# Patient Record
Sex: MALE | Race: OTHER | NOT HISPANIC OR LATINO | URBAN - METROPOLITAN AREA
[De-identification: names, ages, dates, MRNs, and addresses within clinical notes are randomized per-mention and may not be internally consistent; named-entity substitution may affect disease eponyms.]

---

## 2022-12-22 ENCOUNTER — INPATIENT (INPATIENT)
Facility: HOSPITAL | Age: 82
LOS: 5 days | Discharge: NURSING, REHAB OR LONG-TERM CARE | DRG: 637 | End: 2022-12-28
Attending: INTERNAL MEDICINE | Admitting: STUDENT IN AN ORGANIZED HEALTH CARE EDUCATION/TRAINING PROGRAM
Payer: MEDICARE

## 2022-12-22 VITALS
DIASTOLIC BLOOD PRESSURE: 53 MMHG | HEART RATE: 128 BPM | OXYGEN SATURATION: 100 % | WEIGHT: 154.32 LBS | RESPIRATION RATE: 20 BRPM | SYSTOLIC BLOOD PRESSURE: 84 MMHG

## 2022-12-22 LAB
ALBUMIN SERPL ELPH-MCNC: 2.5 G/DL — LOW (ref 3.4–5)
ALP SERPL-CCNC: 76 U/L — SIGNIFICANT CHANGE UP (ref 40–120)
ALT FLD-CCNC: 13 U/L — SIGNIFICANT CHANGE UP (ref 12–42)
ANION GAP SERPL CALC-SCNC: 12 MMOL/L — SIGNIFICANT CHANGE UP (ref 5–17)
ANION GAP SERPL CALC-SCNC: 14 MMOL/L — SIGNIFICANT CHANGE UP (ref 5–17)
ANION GAP SERPL CALC-SCNC: 22 MMOL/L — HIGH (ref 5–17)
ANION GAP SERPL CALC-SCNC: 32 MMOL/L — HIGH (ref 9–16)
ANISOCYTOSIS BLD QL: SLIGHT — SIGNIFICANT CHANGE UP
APPEARANCE UR: CLEAR — SIGNIFICANT CHANGE UP
APTT BLD: 27 SEC — LOW (ref 27.5–35.5)
AST SERPL-CCNC: 14 U/L — LOW (ref 15–37)
B-OH-BUTYR SERPL-SCNC: 3.6 MMOL/L — HIGH
BACTERIA # UR AUTO: PRESENT /HPF
BASE EXCESS BLDA CALC-SCNC: -14.3 MMOL/L — LOW (ref -2–3)
BASOPHILS # BLD AUTO: 0.03 K/UL — SIGNIFICANT CHANGE UP (ref 0–0.2)
BASOPHILS NFR BLD AUTO: 0.3 % — SIGNIFICANT CHANGE UP (ref 0–2)
BILIRUB SERPL-MCNC: 0.8 MG/DL — SIGNIFICANT CHANGE UP (ref 0.2–1.2)
BILIRUB UR-MCNC: ABNORMAL
BLD GP AB SCN SERPL QL: NEGATIVE — SIGNIFICANT CHANGE UP
BUN SERPL-MCNC: 76 MG/DL — HIGH (ref 7–23)
BUN SERPL-MCNC: 77 MG/DL — HIGH (ref 7–23)
BUN SERPL-MCNC: 86 MG/DL — HIGH (ref 7–23)
BUN SERPL-MCNC: 98 MG/DL — HIGH (ref 7–23)
CALCIUM SERPL-MCNC: 7.7 MG/DL — LOW (ref 8.4–10.5)
CALCIUM SERPL-MCNC: 8.2 MG/DL — LOW (ref 8.5–10.5)
CHLORIDE SERPL-SCNC: 105 MMOL/L — SIGNIFICANT CHANGE UP (ref 96–108)
CHLORIDE SERPL-SCNC: 106 MMOL/L — SIGNIFICANT CHANGE UP (ref 96–108)
CHLORIDE SERPL-SCNC: 107 MMOL/L — SIGNIFICANT CHANGE UP (ref 96–108)
CHLORIDE SERPL-SCNC: 96 MMOL/L — SIGNIFICANT CHANGE UP (ref 96–108)
CO2 BLDA-SCNC: 13 MMOL/L — LOW (ref 19–24)
CO2 SERPL-SCNC: 10 MMOL/L — CRITICAL LOW (ref 22–31)
CO2 SERPL-SCNC: 14 MMOL/L — LOW (ref 22–31)
CO2 SERPL-SCNC: 19 MMOL/L — LOW (ref 22–31)
CO2 SERPL-SCNC: 21 MMOL/L — LOW (ref 22–31)
COLOR SPEC: YELLOW — SIGNIFICANT CHANGE UP
CREAT SERPL-MCNC: 2.85 MG/DL — HIGH (ref 0.5–1.3)
CREAT SERPL-MCNC: 2.98 MG/DL — HIGH (ref 0.5–1.3)
CREAT SERPL-MCNC: 3.45 MG/DL — HIGH (ref 0.5–1.3)
CREAT SERPL-MCNC: 4.73 MG/DL — HIGH (ref 0.5–1.3)
DIFF PNL FLD: ABNORMAL
EGFR: 12 ML/MIN/1.73M2 — LOW
EGFR: 17 ML/MIN/1.73M2 — LOW
EGFR: 20 ML/MIN/1.73M2 — LOW
EGFR: 21 ML/MIN/1.73M2 — LOW
EOSINOPHIL # BLD AUTO: 0 K/UL — SIGNIFICANT CHANGE UP (ref 0–0.5)
EOSINOPHIL NFR BLD AUTO: 0 % — SIGNIFICANT CHANGE UP (ref 0–6)
EPI CELLS # UR: SIGNIFICANT CHANGE UP /HPF (ref 0–5)
FLUAV AG NPH QL: SIGNIFICANT CHANGE UP
FLUBV AG NPH QL: SIGNIFICANT CHANGE UP
GLUCOSE BLDC GLUCOMTR-MCNC: 423 MG/DL — HIGH (ref 70–99)
GLUCOSE BLDC GLUCOMTR-MCNC: 458 MG/DL — CRITICAL HIGH (ref 70–99)
GLUCOSE BLDC GLUCOMTR-MCNC: 523 MG/DL — CRITICAL HIGH (ref 70–99)
GLUCOSE BLDC GLUCOMTR-MCNC: 528 MG/DL — CRITICAL HIGH (ref 70–99)
GLUCOSE BLDC GLUCOMTR-MCNC: 600 MG/DL — CRITICAL HIGH (ref 70–99)
GLUCOSE BLDC GLUCOMTR-MCNC: >600 MG/DL — CRITICAL HIGH (ref 70–99)
GLUCOSE SERPL-MCNC: 1124 MG/DL — CRITICAL HIGH (ref 70–99)
GLUCOSE SERPL-MCNC: 510 MG/DL — CRITICAL HIGH (ref 70–99)
GLUCOSE SERPL-MCNC: 574 MG/DL — CRITICAL HIGH (ref 70–99)
GLUCOSE SERPL-MCNC: 863 MG/DL — CRITICAL HIGH (ref 70–99)
GLUCOSE UR QL: >=1000
HCO3 BLDA-SCNC: 12 MMOL/L — LOW (ref 21–28)
HCT VFR BLD CALC: 43.5 % — SIGNIFICANT CHANGE UP (ref 39–50)
HGB BLD-MCNC: 12.7 G/DL — LOW (ref 13–17)
HOROWITZ INDEX BLDA+IHG-RTO: 28 — SIGNIFICANT CHANGE UP
IMM GRANULOCYTES NFR BLD AUTO: 3.3 % — HIGH (ref 0–0.9)
INR BLD: 0.96 — SIGNIFICANT CHANGE UP (ref 0.88–1.16)
KETONES UR-MCNC: 15 MG/DL
LACTATE SERPL-SCNC: 1.7 MMOL/L — SIGNIFICANT CHANGE UP (ref 0.5–2)
LACTATE SERPL-SCNC: 2.8 MMOL/L — HIGH (ref 0.5–2)
LACTATE SERPL-SCNC: 3 MMOL/L — HIGH (ref 0.5–2)
LACTATE SERPL-SCNC: 9.1 MMOL/L — CRITICAL HIGH (ref 0.4–2)
LEUKOCYTE ESTERASE UR-ACNC: NEGATIVE — SIGNIFICANT CHANGE UP
LYMPHOCYTES # BLD AUTO: 0.26 K/UL — LOW (ref 1–3.3)
LYMPHOCYTES # BLD AUTO: 2.5 % — LOW (ref 13–44)
MACROCYTES BLD QL: SIGNIFICANT CHANGE UP
MAGNESIUM SERPL-MCNC: 2.8 MG/DL — HIGH (ref 1.6–2.6)
MAGNESIUM SERPL-MCNC: 3.3 MG/DL — HIGH (ref 1.6–2.6)
MANUAL SMEAR VERIFICATION: SIGNIFICANT CHANGE UP
MCHC RBC-ENTMCNC: 29.2 GM/DL — LOW (ref 32–36)
MCHC RBC-ENTMCNC: 34.2 PG — HIGH (ref 27–34)
MCV RBC AUTO: 117.3 FL — HIGH (ref 80–100)
MONOCYTES # BLD AUTO: 0.74 K/UL — SIGNIFICANT CHANGE UP (ref 0–0.9)
MONOCYTES NFR BLD AUTO: 7.1 % — SIGNIFICANT CHANGE UP (ref 2–14)
NEUTROPHILS # BLD AUTO: 9.07 K/UL — HIGH (ref 1.8–7.4)
NEUTROPHILS NFR BLD AUTO: 86.8 % — HIGH (ref 43–77)
NITRITE UR-MCNC: NEGATIVE — SIGNIFICANT CHANGE UP
NRBC # BLD: 0 /100 WBCS — SIGNIFICANT CHANGE UP (ref 0–0)
PCO2 BLDA: 28 MMHG — LOW (ref 35–48)
PCO2 BLDV: 31 MMHG — LOW (ref 42–55)
PCO2 BLDV: 33 MMHG — LOW (ref 42–55)
PH BLDA: 7.23 — LOW (ref 7.35–7.45)
PH BLDV: 6.95 — LOW (ref 7.32–7.43)
PH BLDV: 7.1 — LOW (ref 7.32–7.43)
PH UR: 6 — SIGNIFICANT CHANGE UP (ref 5–8)
PHOSPHATE SERPL-MCNC: 3.8 MG/DL — SIGNIFICANT CHANGE UP (ref 2.5–4.5)
PLAT MORPH BLD: NORMAL — SIGNIFICANT CHANGE UP
PLATELET # BLD AUTO: 304 K/UL — SIGNIFICANT CHANGE UP (ref 150–400)
PLATELET COUNT - ESTIMATE: NORMAL — SIGNIFICANT CHANGE UP
PO2 BLDA: 104 MMHG — SIGNIFICANT CHANGE UP (ref 83–108)
PO2 BLDV: 43 MMHG — SIGNIFICANT CHANGE UP (ref 25–45)
PO2 BLDV: <35 MMHG — SIGNIFICANT CHANGE UP (ref 25–45)
POLYCHROMASIA BLD QL SMEAR: SLIGHT — SIGNIFICANT CHANGE UP
POTASSIUM SERPL-MCNC: 3.8 MMOL/L — SIGNIFICANT CHANGE UP (ref 3.5–5.3)
POTASSIUM SERPL-MCNC: 3.9 MMOL/L — SIGNIFICANT CHANGE UP (ref 3.5–5.3)
POTASSIUM SERPL-MCNC: 4.1 MMOL/L — SIGNIFICANT CHANGE UP (ref 3.5–5.3)
POTASSIUM SERPL-MCNC: 4.7 MMOL/L — SIGNIFICANT CHANGE UP (ref 3.5–5.3)
POTASSIUM SERPL-SCNC: 3.8 MMOL/L — SIGNIFICANT CHANGE UP (ref 3.5–5.3)
POTASSIUM SERPL-SCNC: 3.9 MMOL/L — SIGNIFICANT CHANGE UP (ref 3.5–5.3)
POTASSIUM SERPL-SCNC: 4.1 MMOL/L — SIGNIFICANT CHANGE UP (ref 3.5–5.3)
POTASSIUM SERPL-SCNC: 4.7 MMOL/L — SIGNIFICANT CHANGE UP (ref 3.5–5.3)
PROT SERPL-MCNC: 5.7 G/DL — LOW (ref 6.4–8.2)
PROT UR-MCNC: 100 MG/DL
PROTHROM AB SERPL-ACNC: 11.2 SEC — SIGNIFICANT CHANGE UP (ref 10.5–13.4)
RBC # BLD: 3.71 M/UL — LOW (ref 4.2–5.8)
RBC # FLD: 15.3 % — HIGH (ref 10.3–14.5)
RBC BLD AUTO: ABNORMAL
RBC CASTS # UR COMP ASSIST: ABNORMAL /HPF
RH IG SCN BLD-IMP: NEGATIVE — SIGNIFICANT CHANGE UP
RSV RNA NPH QL NAA+NON-PROBE: SIGNIFICANT CHANGE UP
SAO2 % BLDA: 100 % — HIGH (ref 94–98)
SAO2 % BLDV: 43.1 % — LOW (ref 67–88)
SAO2 % BLDV: 61.4 % — LOW (ref 67–88)
SARS-COV-2 RNA SPEC QL NAA+PROBE: SIGNIFICANT CHANGE UP
SODIUM SERPL-SCNC: 138 MMOL/L — SIGNIFICANT CHANGE UP (ref 132–145)
SODIUM SERPL-SCNC: 139 MMOL/L — SIGNIFICANT CHANGE UP (ref 135–145)
SODIUM SERPL-SCNC: 140 MMOL/L — SIGNIFICANT CHANGE UP (ref 135–145)
SODIUM SERPL-SCNC: 141 MMOL/L — SIGNIFICANT CHANGE UP (ref 135–145)
SP GR SPEC: 1.01 — SIGNIFICANT CHANGE UP (ref 1–1.03)
TROPONIN T SERPL-MCNC: 0.08 NG/ML — CRITICAL HIGH (ref 0–0.01)
UROBILINOGEN FLD QL: 0.2 E.U./DL — SIGNIFICANT CHANGE UP
WBC # BLD: 10.44 K/UL — SIGNIFICANT CHANGE UP (ref 3.8–10.5)
WBC # FLD AUTO: 10.44 K/UL — SIGNIFICANT CHANGE UP (ref 3.8–10.5)
WBC UR QL: < 5 /HPF — SIGNIFICANT CHANGE UP

## 2022-12-22 PROCEDURE — 99291 CRITICAL CARE FIRST HOUR: CPT

## 2022-12-22 PROCEDURE — 36000 PLACE NEEDLE IN VEIN: CPT

## 2022-12-22 PROCEDURE — 99223 1ST HOSP IP/OBS HIGH 75: CPT | Mod: GC

## 2022-12-22 PROCEDURE — 76937 US GUIDE VASCULAR ACCESS: CPT | Mod: 26

## 2022-12-22 PROCEDURE — 70450 CT HEAD/BRAIN W/O DYE: CPT | Mod: 26

## 2022-12-22 PROCEDURE — 76942 ECHO GUIDE FOR BIOPSY: CPT | Mod: 26

## 2022-12-22 PROCEDURE — 71045 X-RAY EXAM CHEST 1 VIEW: CPT | Mod: 26

## 2022-12-22 PROCEDURE — 36620 INSERTION CATHETER ARTERY: CPT

## 2022-12-22 RX ORDER — POTASSIUM CHLORIDE 20 MEQ
10 PACKET (EA) ORAL ONCE
Refills: 0 | Status: COMPLETED | OUTPATIENT
Start: 2022-12-22 | End: 2022-12-22

## 2022-12-22 RX ORDER — DEXTROSE MONOHYDRATE, SODIUM CHLORIDE, AND POTASSIUM CHLORIDE 50; .745; 4.5 G/1000ML; G/1000ML; G/1000ML
1000 INJECTION, SOLUTION INTRAVENOUS
Refills: 0 | Status: DISCONTINUED | OUTPATIENT
Start: 2022-12-22 | End: 2022-12-22

## 2022-12-22 RX ORDER — INSULIN HUMAN 100 [IU]/ML
10 INJECTION, SOLUTION SUBCUTANEOUS
Qty: 100 | Refills: 0 | Status: DISCONTINUED | OUTPATIENT
Start: 2022-12-22 | End: 2022-12-23

## 2022-12-22 RX ORDER — SODIUM CHLORIDE 9 MG/ML
1000 INJECTION, SOLUTION INTRAVENOUS ONCE
Refills: 0 | Status: COMPLETED | OUTPATIENT
Start: 2022-12-22 | End: 2022-12-22

## 2022-12-22 RX ORDER — CHLORHEXIDINE GLUCONATE 213 G/1000ML
1 SOLUTION TOPICAL
Refills: 0 | Status: DISCONTINUED | OUTPATIENT
Start: 2022-12-22 | End: 2022-12-24

## 2022-12-22 RX ORDER — VANCOMYCIN HCL 1 G
1000 VIAL (EA) INTRAVENOUS ONCE
Refills: 0 | Status: COMPLETED | OUTPATIENT
Start: 2022-12-22 | End: 2022-12-22

## 2022-12-22 RX ORDER — SODIUM CHLORIDE 9 MG/ML
2000 INJECTION INTRAMUSCULAR; INTRAVENOUS; SUBCUTANEOUS ONCE
Refills: 0 | Status: COMPLETED | OUTPATIENT
Start: 2022-12-22 | End: 2022-12-22

## 2022-12-22 RX ORDER — SODIUM CHLORIDE 9 MG/ML
1000 INJECTION INTRAMUSCULAR; INTRAVENOUS; SUBCUTANEOUS ONCE
Refills: 0 | Status: COMPLETED | OUTPATIENT
Start: 2022-12-22 | End: 2022-12-22

## 2022-12-22 RX ORDER — DEXTROSE MONOHYDRATE, SODIUM CHLORIDE, AND POTASSIUM CHLORIDE 50; .745; 4.5 G/1000ML; G/1000ML; G/1000ML
1000 INJECTION, SOLUTION INTRAVENOUS
Refills: 0 | Status: DISCONTINUED | OUTPATIENT
Start: 2022-12-22 | End: 2022-12-23

## 2022-12-22 RX ORDER — SODIUM CHLORIDE 9 MG/ML
1000 INJECTION, SOLUTION INTRAVENOUS ONCE
Refills: 0 | Status: DISCONTINUED | OUTPATIENT
Start: 2022-12-22 | End: 2022-12-22

## 2022-12-22 RX ORDER — SODIUM BICARBONATE 1 MEQ/ML
50 SYRINGE (ML) INTRAVENOUS ONCE
Refills: 0 | Status: COMPLETED | OUTPATIENT
Start: 2022-12-22 | End: 2022-12-22

## 2022-12-22 RX ORDER — POTASSIUM CHLORIDE 20 MEQ
10 PACKET (EA) ORAL
Refills: 0 | Status: COMPLETED | OUTPATIENT
Start: 2022-12-22 | End: 2022-12-23

## 2022-12-22 RX ORDER — SODIUM CHLORIDE 9 MG/ML
1000 INJECTION, SOLUTION INTRAVENOUS
Refills: 0 | Status: DISCONTINUED | OUTPATIENT
Start: 2022-12-22 | End: 2022-12-22

## 2022-12-22 RX ORDER — INSULIN HUMAN 100 [IU]/ML
10 INJECTION, SOLUTION SUBCUTANEOUS ONCE
Refills: 0 | Status: COMPLETED | OUTPATIENT
Start: 2022-12-22 | End: 2022-12-22

## 2022-12-22 RX ORDER — INSULIN HUMAN 100 [IU]/ML
7 INJECTION, SOLUTION SUBCUTANEOUS
Qty: 100 | Refills: 0 | Status: DISCONTINUED | OUTPATIENT
Start: 2022-12-22 | End: 2022-12-22

## 2022-12-22 RX ORDER — PIPERACILLIN AND TAZOBACTAM 4; .5 G/20ML; G/20ML
3.38 INJECTION, POWDER, LYOPHILIZED, FOR SOLUTION INTRAVENOUS ONCE
Refills: 0 | Status: DISCONTINUED | OUTPATIENT
Start: 2022-12-22 | End: 2022-12-22

## 2022-12-22 RX ORDER — PIPERACILLIN AND TAZOBACTAM 4; .5 G/20ML; G/20ML
4.5 INJECTION, POWDER, LYOPHILIZED, FOR SOLUTION INTRAVENOUS ONCE
Refills: 0 | Status: COMPLETED | OUTPATIENT
Start: 2022-12-22 | End: 2022-12-22

## 2022-12-22 RX ADMIN — DEXTROSE MONOHYDRATE, SODIUM CHLORIDE, AND POTASSIUM CHLORIDE 250 MILLILITER(S): 50; .745; 4.5 INJECTION, SOLUTION INTRAVENOUS at 22:57

## 2022-12-22 RX ADMIN — Medication 50 MILLIEQUIVALENT(S): at 14:53

## 2022-12-22 RX ADMIN — PIPERACILLIN AND TAZOBACTAM 4.5 GRAM(S): 4; .5 INJECTION, POWDER, LYOPHILIZED, FOR SOLUTION INTRAVENOUS at 16:57

## 2022-12-22 RX ADMIN — PIPERACILLIN AND TAZOBACTAM 200 GRAM(S): 4; .5 INJECTION, POWDER, LYOPHILIZED, FOR SOLUTION INTRAVENOUS at 14:53

## 2022-12-22 RX ADMIN — Medication 1000 MILLIGRAM(S): at 16:57

## 2022-12-22 RX ADMIN — SODIUM CHLORIDE 1000 MILLILITER(S): 9 INJECTION, SOLUTION INTRAVENOUS at 18:45

## 2022-12-22 RX ADMIN — SODIUM CHLORIDE 2000 MILLILITER(S): 9 INJECTION INTRAMUSCULAR; INTRAVENOUS; SUBCUTANEOUS at 14:52

## 2022-12-22 RX ADMIN — INSULIN HUMAN 10 UNIT(S): 100 INJECTION, SOLUTION SUBCUTANEOUS at 14:55

## 2022-12-22 RX ADMIN — SODIUM CHLORIDE 3000 MILLILITER(S): 9 INJECTION, SOLUTION INTRAVENOUS at 18:45

## 2022-12-22 RX ADMIN — INSULIN HUMAN 7 UNIT(S)/HR: 100 INJECTION, SOLUTION SUBCUTANEOUS at 16:08

## 2022-12-22 RX ADMIN — Medication 250 MILLIGRAM(S): at 14:53

## 2022-12-22 RX ADMIN — SODIUM CHLORIDE 1000 MILLILITER(S): 9 INJECTION INTRAMUSCULAR; INTRAVENOUS; SUBCUTANEOUS at 15:37

## 2022-12-22 RX ADMIN — Medication 100 MILLIEQUIVALENT(S): at 19:40

## 2022-12-22 NOTE — ED ADULT NURSE NOTE - OBJECTIVE STATEMENT
Pt presents to ED as notification by EMS for AMS for a few days.. Hx fo gastric ca in remission. Arrives with FS of high and tachycardic.

## 2022-12-22 NOTE — H&P ADULT - HISTORY OF PRESENT ILLNESS
The patient is a 83 y/o Kyrgyz speaking male with hx of HTN, HLD, AFib, not on AC, DM on insulin, presents with AMS for several days. Per pt's family, pt has been refusing to eat or take any of his meds over the past 2-3 days. Notes patient has been complaining of increased thirst for days. No fever, chills, cough, vomiting, or diarrhea. Daughter called EMS today because he was far less communicative than usual. Normally, pt is A&Ox2-3, ambulatory, conversant.

## 2022-12-22 NOTE — ED PROVIDER NOTE - CLINICAL SUMMARY MEDICAL DECISION MAKING FREE TEXT BOX
81yo M hx of HTN, HLD, DM2, hx of gastric CA in remission, presents with AMS x2-3 days, refusing to eat or take any meds, complaining of increased thirst at home.  On arrival to ED, pt afebrile, tachycardic, initially hypotensive, improved with IV hydration, lethargic, opening eyes to verbal stimuli and localizing to painful stimuli.  FS high.  DDx DKA vs HHC vs severe dehyration w PINA vs sepsis vs ACS vs other.  Plan for labs, IV hydration, insulin, broad spectrum IV abx, CT head.      pH 6.9.  Pt given bicarb and insulin bolus w improvement in mental status; pt now opening eyes spontaneously, communicating, A&Ox2.

## 2022-12-22 NOTE — ED PROVIDER NOTE - OBJECTIVE STATEMENT
83yo M hx of HTN, HLD, AFib, not on AC, DM on insulin, presents with AMS for several days.  Per pt's family, pt has been refusing to eat or take any of his meds over the past 2-3 days.  has been complaining of excessive thirst.  No fever, chills, vomiting, or diarrhea.  No cough.  Daughter called EMS today because he was far less communicative than usual.  Normally, pt is A&Ox2-3, ambulatory, conversant.

## 2022-12-22 NOTE — H&P ADULT - ASSESSMENT
81yo Malay speaking male with PMHx of HTN, HLD, AFib (not on AC), DM on insulin, presented with AMS, decreased PO intake, and increased thirst for several days. Found to be hyperglycemic with anion gap acidosis, admitted to MICU for DKA.     NEUROLOGY  #Metabolic encephalopathy  Lethargic, opening eyes to verbal stimuli and localizing to painful stimuli. 2/2 to severe metabolic acidosis.  - Improving    CARDIOVASCULAR  #Hypovolemic shock  Tachycardic on admission. Likely 2/2 to DKA and osmotic diuresis. Now s/p 4L NS total with some improvement.   - Started 0.45% NS @ 250cc/hr per DKA protocol  - May give additional bolus   - Monitor vitals  - Monitor UOP  - Rest of plan as above    #Afib  Not on AC at home. Rate controlled on admission.    #HTN    #HLD    PULMONARY  SKYLA  -Monitor for pulm edema s/p     GASTROINTESTINAL  #Nutrition    RENAL  #PINA    INFECTIOUS DISEASE  #SKYLA    ENDOCRINE  #DKA  C/o decreased PO intake and increased thirst. On admission, FSG >600, lactate 9.1, BMP glucose >1000, anion gap 32. Reportedly takes lantus 30 units qhs.   - Follow DKA protocol  - Started insulin 7 gtt  - Started 0.45% NS @ 250cc/hr  - Replete K as needed or add into IVF if <3.5     HEME  #SKYLA    PREVENTION  F: s/p 2L NS in ED (1L infiltrated), additional 2L NS given  E: Replete to K>4 and Mg>2  N: NPO  DVT ppx:   GI ppx: none    DISPO: MICU  CODE STATUS: FULL

## 2022-12-22 NOTE — ED ADULT TRIAGE NOTE - CHIEF COMPLAINT QUOTE
Pt BIBEMS from home for AMS. FS in field reading HIGH. As per EMS family members states pt has been "out of it" for 2 days. Pt with known history of diabetes. Notification called by EMS prior to arrival.

## 2022-12-22 NOTE — PROCEDURE NOTE - NSICDXPROCEDURE_GEN_ALL_CORE_FT
PROCEDURES:  Insertion of intravenous catheter with ultrasound guidance 22-Dec-2022 18:34:18  Edwar Stockton

## 2022-12-22 NOTE — ED PROVIDER NOTE - PROGRESS NOTE DETAILS
pH improved to 7.10.    CMP (drawn after 2L NS, 1st sample was hemolyzed) shows bicarb 10, elevated anion gap, glucose >1000, consistent with DKA.  Pt already on insulin drip at 7u/hr.  Will increase drip rate to 10u/hr.  Will give next liter of saline with 20mEq potassium.    CXR w possible L sided infiltrate.  Pt satting well on RA.  Received broad spectrum abx.    CT head without large intracranial hemorrhage.  No urine output from argueta.  Cr elevated.    Plan to admit to MICU at .

## 2022-12-22 NOTE — ED PROVIDER NOTE - CRITICAL CARE ATTENDING CONTRIBUTION TO CARE
The patient was seen immediately upon arrival due to a high probability of imminent or life-threatening deterioration secondary to altered mental status, which required my direct attention, intervention, and personal management at the bedside. I have personally provided critical care time exclusive of time spent on separately billable procedures. Time includes review of laboratory data, radiology results, discussion with consultants, discussion with the patient's family, and monitoring for potential decompensation.

## 2022-12-22 NOTE — ED PROVIDER NOTE - PHYSICAL EXAMINATION
Constitutional:  chronically ill appearing, lethargic, opens eyes to verbal stimuli  HEENT: Airway patent, Nasal mucosa clear. dry mucous membranes  Eyes: Clear bilaterally, pupils equal, round and reactive to light.  Cardiac: tachycardic, irregularly irregular rhythm  Respiratory: Breath sounds clear and equal bilaterally.  GI: Abdomen soft, non-tender, no guarding.  well healed surgical scars  MSK: no peripheral edema, poor skin turgor  Neuro: moving all extremities spontaneously, localizes to painful stimuli  Skin: bruising to upper extremities

## 2022-12-22 NOTE — ED ADULT NURSE NOTE - NSIMPLEMENTINTERV_GEN_ALL_ED
Implemented All Fall Risk Interventions:  [place] to call system. Call bell, personal items and telephone within reach. Instruct patient to call for assistance. Room bathroom lighting operational. Non-slip footwear when patient is off stretcher. Physically safe environment: no spills, clutter or unnecessary equipment. Stretcher in lowest position, wheels locked, appropriate side rails in place. Provide visual cue, wrist band, yellow gown, etc. Monitor gait and stability. Monitor for mental status changes and reorient to person, place, and time. Review medications for side effects contributing to fall risk. Reinforce activity limits and safety measures with patient and family.

## 2022-12-22 NOTE — PROCEDURE NOTE - NSPROCDETAILS_GEN_ALL_CORE
location identified, draped/prepped, sterile technique used/blood seen on insertion/dressing applied/flushes easily/secured in place/sterile technique, catheter placed
location identified, draped/prepped, sterile technique used, needle inserted/introduced/positive blood return obtained via catheter/connected to a pressurized flush line/sutured in place/hemostasis with direct pressure, dressing applied/Seldinger technique/all materials/supplies accounted for at end of procedure

## 2022-12-22 NOTE — PROCEDURE NOTE - ADDITIONAL PROCEDURE DETAILS
20g 1.88 inch USPIV inserted to R AC
20g left radial arterial line placed w ultrasound x 1 attempt appropriate wave from after placement upon transducing.

## 2022-12-22 NOTE — PROCEDURE NOTE - NSICDXPROCEDURE_GEN_ALL_CORE_FT
PROCEDURES:  Insertion of arterial catheter with ultrasound guidance 22-Dec-2022 18:30:59  Edwar Stockton

## 2022-12-22 NOTE — H&P ADULT - NSHPLABSRESULTS_GEN_ALL_CORE
LABS:                        12.7   10.44 )-----------( 304      ( 22 Dec 2022 14:18 )             43.5         141  |  105  |  86<H>  ----------------------------<  863<HH>  3.9   |  14<L>  |  3.45<H>    Ca    7.7<L>      22 Dec 2022 17:55  Phos  3.8       Mg     2.8         TPro  5.7<L>  /  Alb  2.5<L>  /  TBili  0.8  /  DBili  x   /  AST  14<L>  /  ALT  13  /  AlkPhos  76      PT/INR - ( 22 Dec 2022 14:18 )   PT: 11.2 sec;   INR: 0.96          PTT - ( 22 Dec 2022 14:18 )  PTT:27.0 sec  Urinalysis Basic - ( 22 Dec 2022 14:18 )    Color: Yellow / Appearance: Clear / S.010 / pH: x  Gluc: x / Ketone: 15 mg/dL  / Bili: Small / Urobili: 0.2 E.U./dL   Blood: x / Protein: 100 mg/dL / Nitrite: NEGATIVE   Leuk Esterase: NEGATIVE / RBC: Many /HPF / WBC < 5 /HPF   Sq Epi: x / Non Sq Epi: 0-5 /HPF / Bacteria: Present /HPF      POCT Blood Glucose.: >600 mg/dL (22 Dec 2022 19:49)      RADIOLOGY & ADDITIONAL TESTS: Reviewed.

## 2022-12-22 NOTE — H&P ADULT - NSHPPHYSICALEXAM_GEN_ALL_CORE
VITAL SIGNS:  Vital Signs Last 24 Hrs  T(C): 36.3 (22 Dec 2022 19:28), Max: 36.3 (22 Dec 2022 19:28)  T(F): 97.4 (22 Dec 2022 19:28), Max: 97.4 (22 Dec 2022 19:28)  HR: 118 (22 Dec 2022 19:00) (105 - 128)  BP: 107/56 (22 Dec 2022 18:00) (84/53 - 116/58)  BP(mean): 78 (22 Dec 2022 18:00) (78 - 78)  RR: 20 (22 Dec 2022 19:00) (20 - 20)  SpO2: 99% (22 Dec 2022 19:00) (97% - 100%)    Parameters below as of 22 Dec 2022 19:00  Patient On (Oxygen Delivery Method): room air        12-22-22 @ 07:01  -  12-22-22 @ 20:03  --------------------------------------------------------  IN: 2885 mL / OUT: 400 mL / NET: 2485 mL      PHYSICAL EXAM:  Constitutional: resting comfortably; NAD  HEENT: NC/AT, PERRL, EOMI, anicteric sclera, MMM  Neck: supple; no JVD or thyromegaly  Respiratory: CTA B/L; no W/R/R, no retractions  Cardiac: +S1/S2; RRR; no M/R/G  Gastrointestinal: soft, NT/ND; no rebound or guarding; +BSx4  Extremities: WWP, no clubbing or cyanosis; no peripheral edema  Musculoskeletal: NROM x4; no joint swelling, tenderness or erythema  Vascular: 2+ radial, DP/PT pulses B/L  Dermatologic: skin warm, dry and intact; no rashes, wounds, or scars  Neurologic: AAOx3, no focal deficits  Psychiatric: calm, cooperative, behaviors are appropriate, denies SI/HI VITAL SIGNS:  Vital Signs Last 24 Hrs  T(C): 36.3 (22 Dec 2022 19:28), Max: 36.3 (22 Dec 2022 19:28)  T(F): 97.4 (22 Dec 2022 19:28), Max: 97.4 (22 Dec 2022 19:28)  HR: 118 (22 Dec 2022 19:00) (105 - 128)  BP: 107/56 (22 Dec 2022 18:00) (84/53 - 116/58)  BP(mean): 78 (22 Dec 2022 18:00) (78 - 78)  RR: 20 (22 Dec 2022 19:00) (20 - 20)  SpO2: 99% (22 Dec 2022 19:00) (97% - 100%)    Parameters below as of 22 Dec 2022 19:00  Patient On (Oxygen Delivery Method): room air        12-22-22 @ 07:01  -  12-22-22 @ 20:03  --------------------------------------------------------  IN: 2885 mL / OUT: 400 mL / NET: 2485 mL      PHYSICAL EXAM:  Constitutional: resting comfortably; NAD  HEENT: NC/AT, PERRL, anicteric sclera, dry MM  Neck: supple; no thyromegaly  Respiratory: CTA B/L; no W/R/R, no retractions  Cardiac: +S1/S2; RR and tachycardic; no M/R/G  Gastrointestinal: soft, NT/ND; no rebound or guarding; +BSx4  Extremities: WWP, no clubbing or cyanosis; no peripheral edema  Musculoskeletal: no joint swelling, tenderness or erythema  Vascular: 2+ radial, DP/PT pulses B/L  Dermatologic: skin warm, dry and intact; bruising along b/l arms  Neurologic: AAOx1 (able to sa name) but drowsy, not consistently following commands

## 2022-12-23 LAB
A1C WITH ESTIMATED AVERAGE GLUCOSE RESULT: 11.9 % — HIGH (ref 4–5.6)
ANION GAP SERPL CALC-SCNC: 10 MMOL/L — SIGNIFICANT CHANGE UP (ref 5–17)
ANION GAP SERPL CALC-SCNC: 10 MMOL/L — SIGNIFICANT CHANGE UP (ref 5–17)
ANION GAP SERPL CALC-SCNC: 11 MMOL/L — SIGNIFICANT CHANGE UP (ref 5–17)
ANION GAP SERPL CALC-SCNC: 9 MMOL/L — SIGNIFICANT CHANGE UP (ref 5–17)
BASOPHILS # BLD AUTO: 0.01 K/UL — SIGNIFICANT CHANGE UP (ref 0–0.2)
BASOPHILS NFR BLD AUTO: 0.1 % — SIGNIFICANT CHANGE UP (ref 0–2)
BLD GP AB SCN SERPL QL: NEGATIVE — SIGNIFICANT CHANGE UP
BUN SERPL-MCNC: 55 MG/DL — HIGH (ref 7–23)
BUN SERPL-MCNC: 60 MG/DL — HIGH (ref 7–23)
BUN SERPL-MCNC: 67 MG/DL — HIGH (ref 7–23)
BUN SERPL-MCNC: 70 MG/DL — HIGH (ref 7–23)
CALCIUM SERPL-MCNC: 7.4 MG/DL — LOW (ref 8.4–10.5)
CALCIUM SERPL-MCNC: 7.4 MG/DL — LOW (ref 8.4–10.5)
CALCIUM SERPL-MCNC: 7.6 MG/DL — LOW (ref 8.4–10.5)
CALCIUM SERPL-MCNC: 7.7 MG/DL — LOW (ref 8.4–10.5)
CHLORIDE SERPL-SCNC: 110 MMOL/L — HIGH (ref 96–108)
CHLORIDE SERPL-SCNC: 110 MMOL/L — HIGH (ref 96–108)
CHLORIDE SERPL-SCNC: 112 MMOL/L — HIGH (ref 96–108)
CHLORIDE SERPL-SCNC: 113 MMOL/L — HIGH (ref 96–108)
CO2 SERPL-SCNC: 18 MMOL/L — LOW (ref 22–31)
CO2 SERPL-SCNC: 18 MMOL/L — LOW (ref 22–31)
CO2 SERPL-SCNC: 19 MMOL/L — LOW (ref 22–31)
CO2 SERPL-SCNC: 21 MMOL/L — LOW (ref 22–31)
CREAT SERPL-MCNC: 1.86 MG/DL — HIGH (ref 0.5–1.3)
CREAT SERPL-MCNC: 2.15 MG/DL — HIGH (ref 0.5–1.3)
CREAT SERPL-MCNC: 2.45 MG/DL — HIGH (ref 0.5–1.3)
CREAT SERPL-MCNC: 2.6 MG/DL — HIGH (ref 0.5–1.3)
CULTURE RESULTS: NO GROWTH — SIGNIFICANT CHANGE UP
EGFR: 24 ML/MIN/1.73M2 — LOW
EGFR: 25 ML/MIN/1.73M2 — LOW
EGFR: 30 ML/MIN/1.73M2 — LOW
EGFR: 35 ML/MIN/1.73M2 — LOW
EOSINOPHIL # BLD AUTO: 0.01 K/UL — SIGNIFICANT CHANGE UP (ref 0–0.5)
EOSINOPHIL NFR BLD AUTO: 0.1 % — SIGNIFICANT CHANGE UP (ref 0–6)
ESTIMATED AVERAGE GLUCOSE: 295 MG/DL — HIGH (ref 68–114)
GLUCOSE BLDC GLUCOMTR-MCNC: 128 MG/DL — HIGH (ref 70–99)
GLUCOSE BLDC GLUCOMTR-MCNC: 139 MG/DL — HIGH (ref 70–99)
GLUCOSE BLDC GLUCOMTR-MCNC: 149 MG/DL — HIGH (ref 70–99)
GLUCOSE BLDC GLUCOMTR-MCNC: 182 MG/DL — HIGH (ref 70–99)
GLUCOSE BLDC GLUCOMTR-MCNC: 183 MG/DL — HIGH (ref 70–99)
GLUCOSE BLDC GLUCOMTR-MCNC: 205 MG/DL — HIGH (ref 70–99)
GLUCOSE BLDC GLUCOMTR-MCNC: 213 MG/DL — HIGH (ref 70–99)
GLUCOSE BLDC GLUCOMTR-MCNC: 215 MG/DL — HIGH (ref 70–99)
GLUCOSE BLDC GLUCOMTR-MCNC: 217 MG/DL — HIGH (ref 70–99)
GLUCOSE BLDC GLUCOMTR-MCNC: 242 MG/DL — HIGH (ref 70–99)
GLUCOSE BLDC GLUCOMTR-MCNC: 262 MG/DL — HIGH (ref 70–99)
GLUCOSE BLDC GLUCOMTR-MCNC: 264 MG/DL — HIGH (ref 70–99)
GLUCOSE BLDC GLUCOMTR-MCNC: 275 MG/DL — HIGH (ref 70–99)
GLUCOSE BLDC GLUCOMTR-MCNC: 325 MG/DL — HIGH (ref 70–99)
GLUCOSE BLDC GLUCOMTR-MCNC: 367 MG/DL — HIGH (ref 70–99)
GLUCOSE BLDC GLUCOMTR-MCNC: 395 MG/DL — HIGH (ref 70–99)
GLUCOSE BLDC GLUCOMTR-MCNC: 452 MG/DL — CRITICAL HIGH (ref 70–99)
GLUCOSE BLDC GLUCOMTR-MCNC: 456 MG/DL — CRITICAL HIGH (ref 70–99)
GLUCOSE BLDC GLUCOMTR-MCNC: 75 MG/DL — SIGNIFICANT CHANGE UP (ref 70–99)
GLUCOSE BLDC GLUCOMTR-MCNC: 93 MG/DL — SIGNIFICANT CHANGE UP (ref 70–99)
GLUCOSE SERPL-MCNC: 107 MG/DL — HIGH (ref 70–99)
GLUCOSE SERPL-MCNC: 246 MG/DL — HIGH (ref 70–99)
GLUCOSE SERPL-MCNC: 275 MG/DL — HIGH (ref 70–99)
GLUCOSE SERPL-MCNC: 373 MG/DL — HIGH (ref 70–99)
HCT VFR BLD CALC: 27.1 % — LOW (ref 39–50)
HCT VFR BLD CALC: 27.2 % — LOW (ref 39–50)
HCT VFR BLD CALC: 27.7 % — LOW (ref 39–50)
HCT VFR BLD CALC: 28.9 % — LOW (ref 39–50)
HGB BLD-MCNC: 9 G/DL — LOW (ref 13–17)
HGB BLD-MCNC: 9.1 G/DL — LOW (ref 13–17)
HGB BLD-MCNC: 9.2 G/DL — LOW (ref 13–17)
HGB BLD-MCNC: 9.8 G/DL — LOW (ref 13–17)
IMM GRANULOCYTES NFR BLD AUTO: 2 % — HIGH (ref 0–0.9)
LACTATE SERPL-SCNC: 1.5 MMOL/L — SIGNIFICANT CHANGE UP (ref 0.5–2)
LYMPHOCYTES # BLD AUTO: 0.13 K/UL — LOW (ref 1–3.3)
LYMPHOCYTES # BLD AUTO: 0.8 % — LOW (ref 13–44)
MAGNESIUM SERPL-MCNC: 2.2 MG/DL — SIGNIFICANT CHANGE UP (ref 1.6–2.6)
MAGNESIUM SERPL-MCNC: 2.3 MG/DL — SIGNIFICANT CHANGE UP (ref 1.6–2.6)
MCHC RBC-ENTMCNC: 33.1 GM/DL — SIGNIFICANT CHANGE UP (ref 32–36)
MCHC RBC-ENTMCNC: 33.2 GM/DL — SIGNIFICANT CHANGE UP (ref 32–36)
MCHC RBC-ENTMCNC: 33.2 PG — SIGNIFICANT CHANGE UP (ref 27–34)
MCHC RBC-ENTMCNC: 33.3 PG — SIGNIFICANT CHANGE UP (ref 27–34)
MCHC RBC-ENTMCNC: 33.5 PG — SIGNIFICANT CHANGE UP (ref 27–34)
MCHC RBC-ENTMCNC: 33.6 GM/DL — SIGNIFICANT CHANGE UP (ref 32–36)
MCHC RBC-ENTMCNC: 33.8 PG — SIGNIFICANT CHANGE UP (ref 27–34)
MCHC RBC-ENTMCNC: 33.9 GM/DL — SIGNIFICANT CHANGE UP (ref 32–36)
MCV RBC AUTO: 100 FL — SIGNIFICANT CHANGE UP (ref 80–100)
MCV RBC AUTO: 100.7 FL — HIGH (ref 80–100)
MCV RBC AUTO: 99.6 FL — SIGNIFICANT CHANGE UP (ref 80–100)
MCV RBC AUTO: 99.7 FL — SIGNIFICANT CHANGE UP (ref 80–100)
MONOCYTES # BLD AUTO: 0.44 K/UL — SIGNIFICANT CHANGE UP (ref 0–0.9)
MONOCYTES NFR BLD AUTO: 2.6 % — SIGNIFICANT CHANGE UP (ref 2–14)
NEUTROPHILS # BLD AUTO: 15.9 K/UL — HIGH (ref 1.8–7.4)
NEUTROPHILS NFR BLD AUTO: 94.4 % — HIGH (ref 43–77)
NRBC # BLD: 0 /100 WBCS — SIGNIFICANT CHANGE UP (ref 0–0)
NT-PROBNP SERPL-SCNC: HIGH PG/ML (ref 0–300)
OB PNL STL: POSITIVE
PHOSPHATE SERPL-MCNC: 1.4 MG/DL — LOW (ref 2.5–4.5)
PHOSPHATE SERPL-MCNC: 2.4 MG/DL — LOW (ref 2.5–4.5)
PLATELET # BLD AUTO: 131 K/UL — LOW (ref 150–400)
PLATELET # BLD AUTO: 135 K/UL — LOW (ref 150–400)
PLATELET # BLD AUTO: 143 K/UL — LOW (ref 150–400)
PLATELET # BLD AUTO: 147 K/UL — LOW (ref 150–400)
POTASSIUM SERPL-MCNC: 3.8 MMOL/L — SIGNIFICANT CHANGE UP (ref 3.5–5.3)
POTASSIUM SERPL-MCNC: 4.2 MMOL/L — SIGNIFICANT CHANGE UP (ref 3.5–5.3)
POTASSIUM SERPL-MCNC: 4.2 MMOL/L — SIGNIFICANT CHANGE UP (ref 3.5–5.3)
POTASSIUM SERPL-MCNC: 4.3 MMOL/L — SIGNIFICANT CHANGE UP (ref 3.5–5.3)
POTASSIUM SERPL-SCNC: 3.8 MMOL/L — SIGNIFICANT CHANGE UP (ref 3.5–5.3)
POTASSIUM SERPL-SCNC: 4.2 MMOL/L — SIGNIFICANT CHANGE UP (ref 3.5–5.3)
POTASSIUM SERPL-SCNC: 4.2 MMOL/L — SIGNIFICANT CHANGE UP (ref 3.5–5.3)
POTASSIUM SERPL-SCNC: 4.3 MMOL/L — SIGNIFICANT CHANGE UP (ref 3.5–5.3)
RBC # BLD: 2.7 M/UL — LOW (ref 4.2–5.8)
RBC # BLD: 2.72 M/UL — LOW (ref 4.2–5.8)
RBC # BLD: 2.77 M/UL — LOW (ref 4.2–5.8)
RBC # BLD: 2.9 M/UL — LOW (ref 4.2–5.8)
RBC # FLD: 14.5 % — SIGNIFICANT CHANGE UP (ref 10.3–14.5)
RBC # FLD: 14.6 % — HIGH (ref 10.3–14.5)
RBC # FLD: 14.7 % — HIGH (ref 10.3–14.5)
RBC # FLD: 14.9 % — HIGH (ref 10.3–14.5)
RH IG SCN BLD-IMP: NEGATIVE — SIGNIFICANT CHANGE UP
SODIUM SERPL-SCNC: 139 MMOL/L — SIGNIFICANT CHANGE UP (ref 135–145)
SODIUM SERPL-SCNC: 140 MMOL/L — SIGNIFICANT CHANGE UP (ref 135–145)
SODIUM SERPL-SCNC: 140 MMOL/L — SIGNIFICANT CHANGE UP (ref 135–145)
SODIUM SERPL-SCNC: 142 MMOL/L — SIGNIFICANT CHANGE UP (ref 135–145)
SPECIMEN SOURCE: SIGNIFICANT CHANGE UP
TROPONIN T SERPL-MCNC: 0.06 NG/ML — CRITICAL HIGH (ref 0–0.01)
TROPONIN T SERPL-MCNC: 0.08 NG/ML — CRITICAL HIGH (ref 0–0.01)
WBC # BLD: 10.35 K/UL — SIGNIFICANT CHANGE UP (ref 3.8–10.5)
WBC # BLD: 11.64 K/UL — HIGH (ref 3.8–10.5)
WBC # BLD: 14.03 K/UL — HIGH (ref 3.8–10.5)
WBC # BLD: 16.83 K/UL — HIGH (ref 3.8–10.5)
WBC # FLD AUTO: 10.35 K/UL — SIGNIFICANT CHANGE UP (ref 3.8–10.5)
WBC # FLD AUTO: 11.64 K/UL — HIGH (ref 3.8–10.5)
WBC # FLD AUTO: 14.03 K/UL — HIGH (ref 3.8–10.5)
WBC # FLD AUTO: 16.83 K/UL — HIGH (ref 3.8–10.5)

## 2022-12-23 PROCEDURE — 93308 TTE F-UP OR LMTD: CPT | Mod: 26,GC

## 2022-12-23 PROCEDURE — 76604 US EXAM CHEST: CPT | Mod: 26,GC

## 2022-12-23 PROCEDURE — 71045 X-RAY EXAM CHEST 1 VIEW: CPT | Mod: 26

## 2022-12-23 PROCEDURE — 99291 CRITICAL CARE FIRST HOUR: CPT | Mod: GC

## 2022-12-23 PROCEDURE — 93306 TTE W/DOPPLER COMPLETE: CPT | Mod: 26

## 2022-12-23 PROCEDURE — 99222 1ST HOSP IP/OBS MODERATE 55: CPT | Mod: GC

## 2022-12-23 RX ORDER — PANTOPRAZOLE SODIUM 20 MG/1
80 TABLET, DELAYED RELEASE ORAL ONCE
Refills: 0 | Status: COMPLETED | OUTPATIENT
Start: 2022-12-23 | End: 2022-12-23

## 2022-12-23 RX ORDER — SODIUM CHLORIDE 9 MG/ML
1000 INJECTION, SOLUTION INTRAVENOUS
Refills: 0 | Status: DISCONTINUED | OUTPATIENT
Start: 2022-12-23 | End: 2022-12-24

## 2022-12-23 RX ORDER — HUMAN INSULIN 100 [IU]/ML
30 INJECTION, SUSPENSION SUBCUTANEOUS ONCE
Refills: 0 | Status: DISCONTINUED | OUTPATIENT
Start: 2022-12-23 | End: 2022-12-23

## 2022-12-23 RX ORDER — DEXTROSE 50 % IN WATER 50 %
25 SYRINGE (ML) INTRAVENOUS ONCE
Refills: 0 | Status: COMPLETED | OUTPATIENT
Start: 2022-12-23 | End: 2022-12-23

## 2022-12-23 RX ORDER — DEXTROSE 50 % IN WATER 50 %
15 SYRINGE (ML) INTRAVENOUS ONCE
Refills: 0 | Status: DISCONTINUED | OUTPATIENT
Start: 2022-12-23 | End: 2022-12-28

## 2022-12-23 RX ORDER — INSULIN GLARGINE 100 [IU]/ML
30 INJECTION, SOLUTION SUBCUTANEOUS AT BEDTIME
Refills: 0 | Status: DISCONTINUED | OUTPATIENT
Start: 2022-12-23 | End: 2022-12-24

## 2022-12-23 RX ORDER — PANTOPRAZOLE SODIUM 20 MG/1
40 TABLET, DELAYED RELEASE ORAL EVERY 12 HOURS
Refills: 0 | Status: DISCONTINUED | OUTPATIENT
Start: 2022-12-23 | End: 2022-12-23

## 2022-12-23 RX ORDER — DEXTROSE 50 % IN WATER 50 %
12.5 SYRINGE (ML) INTRAVENOUS ONCE
Refills: 0 | Status: DISCONTINUED | OUTPATIENT
Start: 2022-12-23 | End: 2022-12-24

## 2022-12-23 RX ORDER — INSULIN HUMAN 100 [IU]/ML
6 INJECTION, SOLUTION SUBCUTANEOUS ONCE
Refills: 0 | Status: COMPLETED | OUTPATIENT
Start: 2022-12-23 | End: 2022-12-23

## 2022-12-23 RX ORDER — HUMAN INSULIN 100 [IU]/ML
15 INJECTION, SUSPENSION SUBCUTANEOUS ONCE
Refills: 0 | Status: COMPLETED | OUTPATIENT
Start: 2022-12-23 | End: 2022-12-23

## 2022-12-23 RX ORDER — SODIUM CHLORIDE 9 MG/ML
1000 INJECTION, SOLUTION INTRAVENOUS
Refills: 0 | Status: DISCONTINUED | OUTPATIENT
Start: 2022-12-23 | End: 2022-12-23

## 2022-12-23 RX ORDER — DEXTROSE 50 % IN WATER 50 %
25 SYRINGE (ML) INTRAVENOUS ONCE
Refills: 0 | Status: DISCONTINUED | OUTPATIENT
Start: 2022-12-23 | End: 2022-12-24

## 2022-12-23 RX ORDER — PANTOPRAZOLE SODIUM 20 MG/1
8 TABLET, DELAYED RELEASE ORAL
Qty: 80 | Refills: 0 | Status: DISCONTINUED | OUTPATIENT
Start: 2022-12-23 | End: 2022-12-24

## 2022-12-23 RX ORDER — INSULIN LISPRO 100/ML
VIAL (ML) SUBCUTANEOUS EVERY 6 HOURS
Refills: 0 | Status: DISCONTINUED | OUTPATIENT
Start: 2022-12-23 | End: 2022-12-24

## 2022-12-23 RX ORDER — GLUCAGON INJECTION, SOLUTION 0.5 MG/.1ML
1 INJECTION, SOLUTION SUBCUTANEOUS ONCE
Refills: 0 | Status: DISCONTINUED | OUTPATIENT
Start: 2022-12-23 | End: 2022-12-28

## 2022-12-23 RX ADMIN — Medication 100 MILLIEQUIVALENT(S): at 01:08

## 2022-12-23 RX ADMIN — INSULIN HUMAN 10 UNIT(S)/HR: 100 INJECTION, SOLUTION SUBCUTANEOUS at 03:48

## 2022-12-23 RX ADMIN — PANTOPRAZOLE SODIUM 10 MG/HR: 20 TABLET, DELAYED RELEASE ORAL at 19:40

## 2022-12-23 RX ADMIN — SODIUM CHLORIDE 250 MILLILITER(S): 9 INJECTION, SOLUTION INTRAVENOUS at 08:00

## 2022-12-23 RX ADMIN — Medication 25 MILLILITER(S): at 10:41

## 2022-12-23 RX ADMIN — CHLORHEXIDINE GLUCONATE 1 APPLICATION(S): 213 SOLUTION TOPICAL at 07:00

## 2022-12-23 RX ADMIN — DEXTROSE MONOHYDRATE, SODIUM CHLORIDE, AND POTASSIUM CHLORIDE 250 MILLILITER(S): 50; .745; 4.5 INJECTION, SOLUTION INTRAVENOUS at 03:42

## 2022-12-23 RX ADMIN — Medication 2: at 23:41

## 2022-12-23 RX ADMIN — INSULIN GLARGINE 30 UNIT(S): 100 INJECTION, SOLUTION SUBCUTANEOUS at 21:45

## 2022-12-23 RX ADMIN — Medication 100 MILLIEQUIVALENT(S): at 02:03

## 2022-12-23 RX ADMIN — Medication 4: at 19:30

## 2022-12-23 RX ADMIN — PANTOPRAZOLE SODIUM 80 MILLIGRAM(S): 20 TABLET, DELAYED RELEASE ORAL at 09:25

## 2022-12-23 RX ADMIN — PANTOPRAZOLE SODIUM 10 MG/HR: 20 TABLET, DELAYED RELEASE ORAL at 10:19

## 2022-12-23 RX ADMIN — INSULIN HUMAN 6 UNIT(S): 100 INJECTION, SOLUTION SUBCUTANEOUS at 14:18

## 2022-12-23 RX ADMIN — Medication 85 MILLIMOLE(S): at 10:19

## 2022-12-23 RX ADMIN — Medication 100 MILLIEQUIVALENT(S): at 03:48

## 2022-12-23 RX ADMIN — HUMAN INSULIN 15 UNIT(S): 100 INJECTION, SUSPENSION SUBCUTANEOUS at 10:41

## 2022-12-23 NOTE — PROVIDER CONTACT NOTE (CRITICAL VALUE NOTIFICATION) - ASSESSMENT
A&Ox0, pt only respond to y/n questions. on RA, ST to 110-120s. Insulin drip and fluids titrated to DKA protocol.

## 2022-12-23 NOTE — DIETITIAN INITIAL EVALUATION ADULT - NUTRITION DIAGNOSITC TERMINOLOGY #1
Altered Nutrition Related Lab Values PAST SURGICAL HISTORY:  S/P colonoscopy -dec 2021    S/P knee surgery - june 2021

## 2022-12-23 NOTE — CONSULT NOTE ADULT - ATTENDING COMMENTS
GI consulted for anemia    #Macrocytic Anemia on admission  #DKA/HHS resolving    Recommendations:  send iron studies  send B12 / folate  Trend HgB  in absence of overt GI bleeding, patient should obtain EGD/Colonoscopy as outpatient

## 2022-12-23 NOTE — PROCEDURE NOTE - NSUSCPTCODES_ED_ALL
43032 Echocardiography Transthoracic with Image 2D (Echo/FAST)
56779 US Chest (PTX, Pleural Effussion/CHF vs COPD)

## 2022-12-23 NOTE — PROVIDER CONTACT NOTE (CRITICAL VALUE NOTIFICATION) - SITUATION
pt admitted 12/22 for AMS, and serum glucose 1124, pt tachycardic, had 2 beats of Vtach, pt asymptomatic. EKG done, troponin labs drawn. troponin resulted 0.08

## 2022-12-23 NOTE — PROGRESS NOTE ADULT - SUBJECTIVE AND OBJECTIVE BOX
O/N Events: AG closing, 5 beats of V Tach, trops plateaued at 0.8, 1 episode of melena, pt having brownish secretions, cxr showing basilar opacity     Subjective/ROS: Patient seen and examined at bedside.     Denies Fever/Chills, HA, CP, SOB, n/v, changes in bowel/urinary habits.  12pt ROS otherwise negative.    VITALS  Vital Signs Last 24 Hrs  T(C): 37.3 (23 Dec 2022 19:00), Max: 37.3 (23 Dec 2022 19:00)  T(F): 99.2 (23 Dec 2022 19:00), Max: 99.2 (23 Dec 2022 19:00)  HR: 90 (23 Dec 2022 20:00) (78 - 118)  BP: 93/54 (23 Dec 2022 20:00) (80/44 - 114/65)  BP(mean): 69 (23 Dec 2022 20:00) (60 - 81)  RR: 17 (23 Dec 2022 20:00) (16 - 24)  SpO2: 100% (23 Dec 2022 20:00) (96% - 100%)    Parameters below as of 23 Dec 2022 15:00  Patient On (Oxygen Delivery Method): room air        CAPILLARY BLOOD GLUCOSE      POCT Blood Glucose.: 242 mg/dL (23 Dec 2022 19:20)  POCT Blood Glucose.: 139 mg/dL (23 Dec 2022 18:31)  POCT Blood Glucose.: 262 mg/dL (23 Dec 2022 18:26)  POCT Blood Glucose.: 264 mg/dL (23 Dec 2022 15:13)  POCT Blood Glucose.: 275 mg/dL (23 Dec 2022 14:03)  POCT Blood Glucose.: 215 mg/dL (23 Dec 2022 11:57)  POCT Blood Glucose.: 217 mg/dL (23 Dec 2022 10:46)  POCT Blood Glucose.: 182 mg/dL (23 Dec 2022 10:13)  POCT Blood Glucose.: 149 mg/dL (23 Dec 2022 09:10)  POCT Blood Glucose.: 75 mg/dL (23 Dec 2022 08:43)  POCT Blood Glucose.: 93 mg/dL (23 Dec 2022 08:01)  POCT Blood Glucose.: 128 mg/dL (23 Dec 2022 07:12)  POCT Blood Glucose.: 205 mg/dL (23 Dec 2022 05:57)  POCT Blood Glucose.: 325 mg/dL (23 Dec 2022 04:58)  POCT Blood Glucose.: 367 mg/dL (23 Dec 2022 03:59)  POCT Blood Glucose.: 395 mg/dL (23 Dec 2022 03:00)  POCT Blood Glucose.: 456 mg/dL (23 Dec 2022 02:08)  POCT Blood Glucose.: 452 mg/dL (23 Dec 2022 01:02)  POCT Blood Glucose.: 423 mg/dL (22 Dec 2022 23:59)  POCT Blood Glucose.: 458 mg/dL (22 Dec 2022 23:01)  POCT Blood Glucose.: 600 mg/dL (22 Dec 2022 21:56)  POCT Blood Glucose.: 528 mg/dL (22 Dec 2022 21:05)  POCT Blood Glucose.: 523 mg/dL (22 Dec 2022 21:03)      PHYSICAL EXAM  Constitutional: NAD  HEENT: NC/AT, dry MMM  Neck: supple; no jvd  Respiratory: clear, no w/c/r  Cardiac: +S1/S2; RR and tachycardic; no M/R/G  Gastrointestinal: BS wnl, soft, NT/ND; no rebound or guarding   Extremities: WWP, no clubbing or cyanosis; no peripheral edema  Vascular: 2+ radial, DP/PT pulses B/L  Neurologic: AAOx1, drowsy     MEDICATIONS  (STANDING):  chlorhexidine 4% Liquid 1 Application(s) Topical <User Schedule>  dextrose 5%. 1000 milliLiter(s) (50 mL/Hr) IV Continuous <Continuous>  dextrose 5%. 1000 milliLiter(s) (100 mL/Hr) IV Continuous <Continuous>  dextrose 50% Injectable 25 Gram(s) IV Push once  dextrose 50% Injectable 25 Gram(s) IV Push once  dextrose 50% Injectable 12.5 Gram(s) IV Push once  glucagon  Injectable 1 milliGRAM(s) IntraMuscular once  insulin glargine Injectable (LANTUS) 30 Unit(s) SubCutaneous at bedtime  insulin lispro (ADMELOG) corrective regimen sliding scale   SubCutaneous every 6 hours  pantoprazole Infusion 8 mG/Hr (10 mL/Hr) IV Continuous <Continuous>    MEDICATIONS  (PRN):  dextrose Oral Gel 15 Gram(s) Oral once PRN Blood Glucose LESS THAN 70 milliGRAM(s)/deciliter      Allergy Status Unknown      LABS                        9.0    11.64 )-----------( 135      ( 23 Dec 2022 13:24 )             27.2     12    139  |  110<H>  |  60<H>  ----------------------------<  275<H>  4.2   |  18<L>  |  2.15<H>    Ca    7.4<L>      23 Dec 2022 13:24  Phos  1.4       Mg     2.2         TPro  5.7<L>  /  Alb  2.5<L>  /  TBili  0.8  /  DBili  x   /  AST  14<L>  /  ALT  13  /  AlkPhos  76  12-    PT/INR - ( 22 Dec 2022 14:18 )   PT: 11.2 sec;   INR: 0.96          PTT - ( 22 Dec 2022 14:18 )  PTT:27.0 sec  Urinalysis Basic - ( 22 Dec 2022 14:18 )    Color: Yellow / Appearance: Clear / S.010 / pH: x  Gluc: x / Ketone: 15 mg/dL  / Bili: Small / Urobili: 0.2 E.U./dL   Blood: x / Protein: 100 mg/dL / Nitrite: NEGATIVE   Leuk Esterase: NEGATIVE / RBC: Many /HPF / WBC < 5 /HPF   Sq Epi: x / Non Sq Epi: 0-5 /HPF / Bacteria: Present /HPF      CARDIAC MARKERS ( 23 Dec 2022 13:24 )  x     / 0.06 ng/mL / x     / x     / x      CARDIAC MARKERS ( 23 Dec 2022 03:45 )  x     / 0.08 ng/mL / x     / x     / x      CARDIAC MARKERS ( 22 Dec 2022 22:53 )  x     / 0.08 ng/mL / x     / x     / x              IMAGING/EKG/ETC  Reviewed

## 2022-12-23 NOTE — DIETITIAN INITIAL EVALUATION ADULT - PERTINENT LABORATORY DATA
12-23    139  |  110<H>  |  60<H>  ----------------------------<  275<H>  4.2   |  18<L>  |  2.15<H>    Ca    7.4<L>      23 Dec 2022 13:24  Phos  1.4     12-23  Mg     2.2     12-23    TPro  5.7<L>  /  Alb  2.5<L>  /  TBili  0.8  /  DBili  x   /  AST  14<L>  /  ALT  13  /  AlkPhos  76  12-22  POCT Blood Glucose.: 264 mg/dL (12-23-22 @ 15:13)  A1C with Estimated Average Glucose Result: 11.9 % (12-23-22 @ 03:45)

## 2022-12-23 NOTE — PROGRESS NOTE ADULT - ASSESSMENT
83yo Latvian speaking male with PMHx of HTN, HLD, AFib (not on AC), DM on insulin, presented with AMS, decreased PO intake, and increased thirst for several days. Found to be hyperglycemic with anion gap acidosis, admitted to MICU for DKA.     NEUROLOGY  #Metabolic encephalopathy  Lethargic, opening eyes to verbal stimuli and localizing to painful stimuli. 2/2 to severe metabolic acidosis.  - Improving    CARDIOVASCULAR  #Hypovolemic shock  Tachycardic on admission. Likely 2/2 to DKA and osmotic diuresis. Now s/p 4L NS total with some improvement.   - Started 0.45% NS @ 250cc/hr per DKA protocol the switched to D5 and 1/2NS for fluid resuscitation   - Monitor vitals  - Monitor UOP  - Holding DVT prophylaxis in the setting of GI bleed   - Rest of plan as above    #Afib  Not on AC at home. Rate controlled on admission.    #HTN    #HLD    PULMONARY  SKYLA  - on 2L NC   -Monitor for pulm edema s/p     GASTROINTESTINAL  #Melena  - 2 episodes in am  - started on IV Pantoprazole 80mg ggt   - f/u FOBT   - GI consulted   - trend Hgb     RENAL  #PINA    INFECTIOUS DISEASE  #SKYLA    ENDOCRINE  #DKA  C/o decreased PO intake and increased thirst. On admission, FSG >600, lactate 9.1, BMP glucose >1000, anion gap 32. Reportedly takes lantus 30 units qhs.   - Follow DKA protocol  - Started insulin 7 gtt, bridged with NPH 15 units, and started on lantus 30 units at bedtime  - Started 0.45% NS @ 250cc/hr and switched to D5 and 1/2NS   - follow up endocrinology recs   - Replete K as needed or add into IVF if <3.5   - c/w ISS     HEME  #SKYLA    PREVENTION  F: s/p 2L NS in ED (1L infiltrated), additional 2L NS given, currently on D5 and 1/2NS   E: Replete to K>4 and Mg>2  N: NPO  DVT ppx: Holding in the setting of GI bleed   GI ppx: Pantoprazole     DISPO: MICU  CODE STATUS: FULL

## 2022-12-23 NOTE — DIETITIAN INITIAL EVALUATION ADULT - PERTINENT MEDS FT
MEDICATIONS  (STANDING):  chlorhexidine 4% Liquid 1 Application(s) Topical <User Schedule>  dextrose 5%. 1000 milliLiter(s) (50 mL/Hr) IV Continuous <Continuous>  dextrose 5%. 1000 milliLiter(s) (100 mL/Hr) IV Continuous <Continuous>  dextrose 50% Injectable 25 Gram(s) IV Push once  dextrose 50% Injectable 12.5 Gram(s) IV Push once  dextrose 50% Injectable 25 Gram(s) IV Push once  glucagon  Injectable 1 milliGRAM(s) IntraMuscular once  insulin glargine Injectable (LANTUS) 30 Unit(s) SubCutaneous at bedtime  insulin lispro (ADMELOG) corrective regimen sliding scale   SubCutaneous every 6 hours  pantoprazole Infusion 8 mG/Hr (10 mL/Hr) IV Continuous <Continuous>    MEDICATIONS  (PRN):  dextrose Oral Gel 15 Gram(s) Oral once PRN Blood Glucose LESS THAN 70 milliGRAM(s)/deciliter

## 2022-12-23 NOTE — PROGRESS NOTE ADULT - ATTENDING COMMENTS
81 yo M, minimal collateral, AMS so unable to provide hx, admitted for DKA/HHS, severe lactic acidosis, PINA, and melena. AG closed, FSG significantly improved, bridged to NPH, continue with fluid resuscitation for HHS. Two episodes of dark tarry stools this AM, send for stool occult although appears to be melena. Borderline hypotension that has improved with IVF. If becomes hemodynamically unstable will transfuse for presumed GIB, otherwise trend CBC, F/U GI. EKG w/ AF, unclear if on A/C at home, need to obtain collateral re: home medication and PMH. Lactate has cleared, UOP 40-50cc/hr, BUN/Cr improving. A1c 11.2%, endo consulted. Unclear etiology for DKA - no obvious infectious etiology, but low threshold to begin empiric abx if any leukocytosis, fever, or change in  hemodynamic. AMS suggesting aspiration risk, but on 2L NC and no respiratory distress. NPO, hold a/c and dvt ppx in setting of GIB.

## 2022-12-23 NOTE — CONSULT NOTE ADULT - ASSESSMENT
81 yo M, HTN, HLD, AFib off AC, DM on insulin, Anemia    GI consulted for anemia    #Macrocytic Anemia on admission  #DKA/HHS resolving    Recommendations:  send iron studies  send B12 / folate  Trend HgB  in absence of overt GI bleeding, patient should obtain EGD/Colonoscopy as outpatient   FOBT is a validated tool for Colon Cancer Screening    Thank you for allowing us to participate in the care of this patient. Please call us if you have any further questions or concerns    Real Pedersen M.D.  Gastroenterology Fellow  Weekday Pager: [phone]  Weeknights/Weekend Coverage: Please Call the  for contact info 83 yo M, HTN, HLD, AFib off AC, DM on insulin, Anemia    GI consulted for anemia    #Macrocytic Anemia on admission  #DKA/HHS resolving    Recommendations:  send iron studies  send B12 / folate  Trend HgB  in absence of overt GI bleeding, patient should obtain EGD/Colonoscopy as outpatient     Thank you for allowing us to participate in the care of this patient. Please call us if you have any further questions or concerns    Real Pedersen M.D.  Gastroenterology Fellow  Weekday Pager: [phone]  Weeknights/Weekend Coverage: Please Call the  for contact info

## 2022-12-23 NOTE — DIETITIAN INITIAL EVALUATION ADULT - OTHER INFO
84 yo M 83 yo M, HTN, HLD, AFib off AC, DM on insulin, pw encephalopathy , reportedly refusing to eat / take medications over the past few days admitted for DKA/HHS, severe lactic acidosis, PINA, and melena. AG closed, FSG significantly improved, bridged to NPH, continue with fluid resuscitation for HHS. Two episodes of dark tarry stools this AM, send for stool occult although appears to be melena. Borderline hypotension that has improved with IVF. EKG w/ AF, unclear if on A/C at home, need to obtain collateral re: home medication and PMH. Lactate has cleared, UOP 40-50cc/hr, BUN/Cr improving. A1c 11.2%, endo consulted. Unclear etiology for DKA - no obvious infectious etiology, but low threshold to begin empiric abx if any leukocytosis, fever, or change in  hemodynamic.     PT assessed at bedside. Remains NPO on IV fluids. Resting in bed on room air this pm. Unable to obtain subjective information as pt with AMS, lethargic. Noted melenic and loose stools. Per chart suspected PO refusal and med refusal leading to DKA. Will continue to follow clinical course closely. No pain noted. Prince score 19. Nutrition recommendations below. 
0

## 2022-12-23 NOTE — DIETITIAN INITIAL EVALUATION ADULT - ADD RECOMMEND
1. Once medically feasible recommend consistent carbohydrate diet  --Follow PO intake closely and consider ONS as needed  2. Monitor electrolytes, adjust and replete prn  4. Monitor BMs, output, GI recommendations  5. Nutrition ed once feasible

## 2022-12-23 NOTE — CONSULT NOTE ADULT - SUBJECTIVE AND OBJECTIVE BOX
GASTROENTEROLOGY CONSULT NOTE  HPI: 83 yo M, HTN, HLD, AFib off AC, DM on insulin,   pw encephalopathy , reportedly refusing to eat / take medications over the past few days   Patient was admitted for DKA to ICU , and was managed with Insulin drip , LA 9.1 , HgB 12.7 on admit labs (hemoconcentrated in s/o volume depletion)  Repeat HgB this am 9.8 , and patient had loose stool, prompting GI consult    At bedside, patient is lethargic, unable to fully participate in line of questioning      Allergies    Allergy Status Unknown    Intolerances      Home Medications:    MEDICATIONS:  MEDICATIONS  (STANDING):  chlorhexidine 4% Liquid 1 Application(s) Topical <User Schedule>  insulin glargine Injectable (LANTUS) 30 Unit(s) SubCutaneous at bedtime  pantoprazole Infusion 8 mG/Hr (10 mL/Hr) IV Continuous <Continuous>    MEDICATIONS  (PRN):    PAST MEDICAL & SURGICAL HISTORY:  Hypertension      Hyperlipidemia      Diabetes mellitus        FAMILY HISTORY: htn    REVIEW OF SYSTEMS:  All other 10 review of systems is negative unless indicated above.    Vital Signs Last 24 Hrs  T(C): 36.6 (23 Dec 2022 09:06), Max: 36.6 (23 Dec 2022 07:00)  T(F): 97.9 (23 Dec 2022 09:06), Max: 97.9 (23 Dec 2022 07:00)  HR: 94 (23 Dec 2022 12:00) (90 - 128)  BP: 102/59 (23 Dec 2022 12:00) (80/44 - 116/58)  BP(mean): 77 (23 Dec 2022 12:00) (60 - 81)  RR: 17 (23 Dec 2022 12:00) (17 - 24)  SpO2: 100% (23 Dec 2022 12:00) (96% - 100%)    Parameters below as of 23 Dec 2022 11:00  Patient On (Oxygen Delivery Method): room air        12-22 @ 07:01  -  12-23 @ 07:00  --------------------------------------------------------  IN: 5712.2 mL / OUT: 1270 mL / NET: 4442.2 mL    12-23 @ 07:01  -  12-23 @ 13:19  --------------------------------------------------------  IN: 1283 mL / OUT: 255 mL / NET: 1028 mL        PHYSICAL EXAM:    General: lying in bed, lethargic  HEENT: Neck supple, mmm, no jvd, poor dentition  Lungs: Normal respiratory effort, no intercostal retractions  Cardiovascular: regular rate  Abdomen: Soft, non-tender non-distended; No rebound or guarding  Extremities: wwp, no cce  Skin: Warm and dry. No obvious rash  Rectal: Normal tone, brown stool    LABS:                        9.1    14.03 )-----------( 147      ( 23 Dec 2022 08:19 )             27.1     12-23    142  |  113<H>  |  67<H>  ----------------------------<  107<H>  4.3   |  19<L>  |  2.45<H>    Ca    7.7<L>      23 Dec 2022 07:02  Phos  1.4     12-23  Mg     2.2     12-23    TPro  5.7<L>  /  Alb  2.5<L>  /  TBili  0.8  /  DBili  x   /  AST  14<L>  /  ALT  13  /  AlkPhos  76  12-22        PT/INR - ( 22 Dec 2022 14:18 )   PT: 11.2 sec;   INR: 0.96          PTT - ( 22 Dec 2022 14:18 )  PTT:27.0 sec    RADIOLOGY & ADDITIONAL STUDIES:     Reviewed

## 2022-12-23 NOTE — DIETITIAN INITIAL EVALUATION ADULT - OTHER CALCULATIONS
IBW 59.1kg (118% IBW); estimated needs based on ABW, needs adjusted for clinical course, DKA, age; fluid per team

## 2022-12-23 NOTE — PROCEDURE NOTE - NSPROCNAME_GEN_A_CORE
Point of Care Ultrasound Cardiac
Point of Care Ultrasound Lung
Peripheral Line Insertion
Arterial Puncture/Cannulation

## 2022-12-24 DIAGNOSIS — E11.9 TYPE 2 DIABETES MELLITUS WITHOUT COMPLICATIONS: ICD-10-CM

## 2022-12-24 DIAGNOSIS — Z29.9 ENCOUNTER FOR PROPHYLACTIC MEASURES, UNSPECIFIED: ICD-10-CM

## 2022-12-24 DIAGNOSIS — K92.1 MELENA: ICD-10-CM

## 2022-12-24 DIAGNOSIS — G93.41 METABOLIC ENCEPHALOPATHY: ICD-10-CM

## 2022-12-24 DIAGNOSIS — I10 ESSENTIAL (PRIMARY) HYPERTENSION: ICD-10-CM

## 2022-12-24 DIAGNOSIS — N17.9 ACUTE KIDNEY FAILURE, UNSPECIFIED: ICD-10-CM

## 2022-12-24 DIAGNOSIS — I50.22 CHRONIC SYSTOLIC (CONGESTIVE) HEART FAILURE: ICD-10-CM

## 2022-12-24 DIAGNOSIS — I48.91 UNSPECIFIED ATRIAL FIBRILLATION: ICD-10-CM

## 2022-12-24 LAB
ALBUMIN SERPL ELPH-MCNC: 2.7 G/DL — LOW (ref 3.3–5)
ALP SERPL-CCNC: 74 U/L — SIGNIFICANT CHANGE UP (ref 40–120)
ALT FLD-CCNC: 8 U/L — LOW (ref 10–45)
ANION GAP SERPL CALC-SCNC: 9 MMOL/L — SIGNIFICANT CHANGE UP (ref 5–17)
AST SERPL-CCNC: 14 U/L — SIGNIFICANT CHANGE UP (ref 10–40)
BASOPHILS # BLD AUTO: 0 K/UL — SIGNIFICANT CHANGE UP (ref 0–0.2)
BASOPHILS NFR BLD AUTO: 0 % — SIGNIFICANT CHANGE UP (ref 0–2)
BILIRUB SERPL-MCNC: 0.3 MG/DL — SIGNIFICANT CHANGE UP (ref 0.2–1.2)
BUN SERPL-MCNC: 44 MG/DL — HIGH (ref 7–23)
CALCIUM SERPL-MCNC: 7.9 MG/DL — LOW (ref 8.4–10.5)
CHLORIDE SERPL-SCNC: 113 MMOL/L — HIGH (ref 96–108)
CO2 SERPL-SCNC: 20 MMOL/L — LOW (ref 22–31)
CREAT SERPL-MCNC: 1.71 MG/DL — HIGH (ref 0.5–1.3)
EGFR: 39 ML/MIN/1.73M2 — LOW
EOSINOPHIL # BLD AUTO: 0.08 K/UL — SIGNIFICANT CHANGE UP (ref 0–0.5)
EOSINOPHIL NFR BLD AUTO: 1 % — SIGNIFICANT CHANGE UP (ref 0–6)
GLUCOSE BLDC GLUCOMTR-MCNC: 139 MG/DL — HIGH (ref 70–99)
GLUCOSE BLDC GLUCOMTR-MCNC: 141 MG/DL — HIGH (ref 70–99)
GLUCOSE BLDC GLUCOMTR-MCNC: 160 MG/DL — HIGH (ref 70–99)
GLUCOSE BLDC GLUCOMTR-MCNC: 206 MG/DL — HIGH (ref 70–99)
GLUCOSE SERPL-MCNC: 146 MG/DL — HIGH (ref 70–99)
HCT VFR BLD CALC: 29.3 % — LOW (ref 39–50)
HGB BLD-MCNC: 9.8 G/DL — LOW (ref 13–17)
IMM GRANULOCYTES NFR BLD AUTO: 1.2 % — HIGH (ref 0–0.9)
LYMPHOCYTES # BLD AUTO: 0.4 K/UL — LOW (ref 1–3.3)
LYMPHOCYTES # BLD AUTO: 4.8 % — LOW (ref 13–44)
MAGNESIUM SERPL-MCNC: 2.1 MG/DL — SIGNIFICANT CHANGE UP (ref 1.6–2.6)
MCHC RBC-ENTMCNC: 33.4 GM/DL — SIGNIFICANT CHANGE UP (ref 32–36)
MCHC RBC-ENTMCNC: 33.8 PG — SIGNIFICANT CHANGE UP (ref 27–34)
MCV RBC AUTO: 101 FL — HIGH (ref 80–100)
MONOCYTES # BLD AUTO: 0.51 K/UL — SIGNIFICANT CHANGE UP (ref 0–0.9)
MONOCYTES NFR BLD AUTO: 6.1 % — SIGNIFICANT CHANGE UP (ref 2–14)
NEUTROPHILS # BLD AUTO: 7.33 K/UL — SIGNIFICANT CHANGE UP (ref 1.8–7.4)
NEUTROPHILS NFR BLD AUTO: 86.9 % — HIGH (ref 43–77)
NRBC # BLD: 0 /100 WBCS — SIGNIFICANT CHANGE UP (ref 0–0)
PHOSPHATE SERPL-MCNC: 2.9 MG/DL — SIGNIFICANT CHANGE UP (ref 2.5–4.5)
PLATELET # BLD AUTO: 144 K/UL — LOW (ref 150–400)
POTASSIUM SERPL-MCNC: 3.9 MMOL/L — SIGNIFICANT CHANGE UP (ref 3.5–5.3)
POTASSIUM SERPL-SCNC: 3.9 MMOL/L — SIGNIFICANT CHANGE UP (ref 3.5–5.3)
PROT SERPL-MCNC: 5.3 G/DL — LOW (ref 6–8.3)
RBC # BLD: 2.9 M/UL — LOW (ref 4.2–5.8)
RBC # FLD: 15 % — HIGH (ref 10.3–14.5)
SODIUM SERPL-SCNC: 142 MMOL/L — SIGNIFICANT CHANGE UP (ref 135–145)
WBC # BLD: 8.42 K/UL — SIGNIFICANT CHANGE UP (ref 3.8–10.5)
WBC # FLD AUTO: 8.42 K/UL — SIGNIFICANT CHANGE UP (ref 3.8–10.5)

## 2022-12-24 PROCEDURE — 99233 SBSQ HOSP IP/OBS HIGH 50: CPT | Mod: GC

## 2022-12-24 RX ORDER — INSULIN GLARGINE 100 [IU]/ML
20 INJECTION, SOLUTION SUBCUTANEOUS AT BEDTIME
Refills: 0 | Status: DISCONTINUED | OUTPATIENT
Start: 2022-12-24 | End: 2022-12-26

## 2022-12-24 RX ORDER — METOPROLOL TARTRATE 50 MG
12.5 TABLET ORAL EVERY 12 HOURS
Refills: 0 | Status: DISCONTINUED | OUTPATIENT
Start: 2022-12-24 | End: 2022-12-26

## 2022-12-24 RX ORDER — PANTOPRAZOLE SODIUM 20 MG/1
40 TABLET, DELAYED RELEASE ORAL EVERY 12 HOURS
Refills: 0 | Status: DISCONTINUED | OUTPATIENT
Start: 2022-12-24 | End: 2022-12-26

## 2022-12-24 RX ORDER — ACETAMINOPHEN 500 MG
650 TABLET ORAL EVERY 6 HOURS
Refills: 0 | Status: DISCONTINUED | OUTPATIENT
Start: 2022-12-24 | End: 2022-12-28

## 2022-12-24 RX ORDER — INSULIN LISPRO 100/ML
VIAL (ML) SUBCUTANEOUS
Refills: 0 | Status: DISCONTINUED | OUTPATIENT
Start: 2022-12-24 | End: 2022-12-28

## 2022-12-24 RX ORDER — INSULIN LISPRO 100/ML
10 VIAL (ML) SUBCUTANEOUS
Refills: 0 | Status: DISCONTINUED | OUTPATIENT
Start: 2022-12-24 | End: 2022-12-24

## 2022-12-24 RX ADMIN — PANTOPRAZOLE SODIUM 10 MG/HR: 20 TABLET, DELAYED RELEASE ORAL at 06:27

## 2022-12-24 RX ADMIN — INSULIN GLARGINE 20 UNIT(S): 100 INJECTION, SOLUTION SUBCUTANEOUS at 21:53

## 2022-12-24 RX ADMIN — Medication 12.5 MILLIGRAM(S): at 21:52

## 2022-12-24 RX ADMIN — Medication 2: at 12:54

## 2022-12-24 RX ADMIN — Medication 650 MILLIGRAM(S): at 20:22

## 2022-12-24 RX ADMIN — CHLORHEXIDINE GLUCONATE 1 APPLICATION(S): 213 SOLUTION TOPICAL at 06:26

## 2022-12-24 RX ADMIN — Medication 4: at 21:53

## 2022-12-24 RX ADMIN — Medication 650 MILLIGRAM(S): at 18:00

## 2022-12-24 RX ADMIN — PANTOPRAZOLE SODIUM 40 MILLIGRAM(S): 20 TABLET, DELAYED RELEASE ORAL at 18:03

## 2022-12-24 NOTE — PHYSICAL THERAPY INITIAL EVALUATION ADULT - PERTINENT HX OF CURRENT PROBLEM, REHAB EVAL
83yo Italian-speaking male with PMHx of HTN, HLD, AFib (not on AC), DM on insulin, presented with AMS, decreased PO intake, and increased thirst for several days. Found to be hyperglycemic with anion gap acidosis, admitted to MICU for DKA.

## 2022-12-24 NOTE — PROGRESS NOTE ADULT - SUBJECTIVE AND OBJECTIVE BOX
TRANSFER 7E TO 7WOL [facility]    INTERVAL HPI/OVERNIGHT EVENTS:  Patient was seen and examined at bedside. As per nurse and patient, no o/n events, patient resting comfortably. No complaints at this time. Patient denies: fever, chills, lightheadedness, weakness, CP, palpitations, SOB, cough, N/V. ROS otherwise negative.  ROS conducted with [place]  at bedside.  Patient does not remember details leading to admission to [facility]. [facility] and [facility], but couldn't remember why. Defers all other questions to his daughter.    VITAL SIGNS:  T(F): 99 (22 @ 09:29)  HR: 79 (22 @ 10:00)  BP: 141/79 (22 @ 10:00)  RR: 18 (22 @ 10:00)  SpO2: 100% (22 @ 10:00)  Wt(kg): --      22 @ 07:01  -  22 @ 07:00  --------------------------------------------------------  IN: 1725 mL / OUT: 1890 mL / NET: -165 mL    22 @ 07:01  -  22 @ 10:55  --------------------------------------------------------  IN: 30 mL / OUT: 250 mL / NET: -220 mL        PHYSICAL EXAM:    Constitutional: elderly male resting comfortably in bed; NAD  HEENT: PER, anicteric sclera, no nasal discharge; MMM  Respiratory: CTA B/L; no W/R/R, no retractions  Cardiac: +S1/S2; RRR; no M/R/G  Gastrointestinal: soft, NT/ND; no rebound or guarding  Extremities: WWP, no clubbing or cyanosis; no peripheral edema;   Vascular: 2+ radial, DP/PT pulses B/L  Dermatologic: diffuse patches along b/l forearms  Neurologic: drowsy, AAO to self and hospital but not which hospital, could not answer date. light touch intact, moving all extremities to command with symmetric strength.    MEDICATIONS  (STANDING):  glucagon  Injectable 1 milliGRAM(s) IntraMuscular once  insulin glargine Injectable (LANTUS) 30 Unit(s) SubCutaneous at bedtime  insulin lispro (ADMELOG) corrective regimen sliding scale   SubCutaneous every 6 hours  pantoprazole  Injectable 40 milliGRAM(s) IV Push every 12 hours    MEDICATIONS  (PRN):  dextrose Oral Gel 15 Gram(s) Oral once PRN Blood Glucose LESS THAN 70 milliGRAM(s)/deciliter      Allergies    Allergy Status Unknown    Intolerances        LABS:                        9.8    8.42  )-----------( 144      ( 24 Dec 2022 06:43 )             29.3     12-24    142  |  113<H>  |  44<H>  ----------------------------<  146<H>  3.9   |  20<L>  |  1.71<H>    Ca    7.9<L>      24 Dec 2022 06:43  Phos  2.9     12-24  Mg     2.1     12-24    TPro  5.3<L>  /  Alb  2.7<L>  /  TBili  0.3  /  DBili  x   /  AST  14  /  ALT  8<L>  /  AlkPhos  74  12-24    PT/INR - ( 22 Dec 2022 14:18 )   PT: 11.2 sec;   INR: 0.96          PTT - ( 22 Dec 2022 14:18 )  PTT:27.0 sec  Urinalysis Basic - ( 22 Dec 2022 14:18 )    Color: Yellow / Appearance: Clear / S.010 / pH: x  Gluc: x / Ketone: 15 mg/dL  / Bili: Small / Urobili: 0.2 E.U./dL   Blood: x / Protein: 100 mg/dL / Nitrite: NEGATIVE   Leuk Esterase: NEGATIVE / RBC: Many /HPF / WBC < 5 /HPF   Sq Epi: x / Non Sq Epi: 0-5 /HPF / Bacteria: Present /HPF        RADIOLOGY & ADDITIONAL TESTS:  Reviewed

## 2022-12-24 NOTE — PHYSICAL THERAPY INITIAL EVALUATION ADULT - GENERAL OBSERVATIONS, REHAB EVAL
PT IE Completed. Pt received semi-supine in bed, +[place], A&Ox1, +RA,+heplock, in NAD and agreeable to work with PT, CAROL Meng and MD Grigsby notified. Pt presents to [facility] with AMS, admitted for DKA. PT exam shows decreased functional strength and impaired gait during ADL and functional mobility. Pt completed bed mob, transfer, gait. Pt left as found, +bed alarm, +call sue within reach, CAROL Meng and MD Grigsby notified. Pt can benefit from PT in order to improve quality of life by increasing strength/endurance/balance with functional mobility and ADLS.

## 2022-12-24 NOTE — PROGRESS NOTE ADULT - PROBLEM SELECTOR PLAN 5
Unclear if acute 2/2 acute stress c/b poor preload from severe dehydration vs chronic HFrEF. Given degree of renal impairment, not good candidate for hormonal blockade GDMT but good candidate for beta blockade given normotension and concurrent a fib.  - pending collateral with daughter, anticipate starting metoprolol  - [facility]  - euvolemic, no indication for diuresis  - consider inpt SGLT2 for triple benefit HF/DM/CKD once Cr at baseline Unclear if acute 2/2 acute stress c/b poor preload from severe dehydration vs chronic HFrEF. Given degree of renal impairment, not good candidate for hormonal blockade GDMT but good candidate for beta blockade given normotension and concurrent a fib. Daughter knows he was on a diuretic as he was hospitalized in November with fluid in his legs and his lungs, but doesn't remember other details.  - pending med rec, anticipate starting metoprolol  - can likely f[facility]  - euvolemic, no indication for diuresis  - consider inpt SGLT2 for triple benefit HF/DM/CKD once Cr at baseline Unclear if acute 2/2 acute stress c/b poor preload from severe dehydration vs chronic HFrEF. Given degree of renal impairment, not good candidate for hormonal blockade GDMT but good candidate for beta blockade given normotension and concurrent a fib. Daughter knows he was on a diuretic as he was hospitalized in November with fluid in his legs and his lungs, but doesn't remember other details.  - pending med rec, anticipate starting metoprolol tartrate 12.5 bid vs going to his home meds  - [facility] likely [facility]  - euvolemic, no indication for diuresis  - consider ACE/ARB/ARNi once Cr plateaued  - consider inpt SGLT2 for triple benefit HF/DM/CKD once Cr at baseline

## 2022-12-24 NOTE — PROGRESS NOTE ADULT - ATTENDING COMMENTS
82 M with PMHx HTN, IDDM, a fib admitted to MICU for overlap DKA/HHS stepped down to RMF.    Plan  -poorly controlled DM; appreciate endo recs; c/w current insulin regimen  -HFrEF, chronic as per daughter; will confirm med rec and reinitiate BB; hold off on ACE/ARB pending PINA resolution  -pAF, holding AC in the setting of melena; starting BB  -GIB, appreciate GI recs, c/w holding AC  -avoid nephrotoxins  -will eventually require ICD eval if EF remains reduced despite GDMT

## 2022-12-24 NOTE — PROGRESS NOTE ADULT - ASSESSMENT
83yo Serbian speaking male with PMHx of HTN, HLD, AFib (not on AC), DM on insulin, presented with AMS, decreased PO intake, and increased thirst for several days. Found to be hyperglycemic with anion gap acidosis, admitted to MICU for DKA.     NEUROLOGY  #Metabolic encephalopathy  Lethargic, opening eyes to verbal stimuli and localizing to painful stimuli. 2/2 to severe metabolic acidosis.  - Improving    CARDIOVASCULAR  #Hypovolemic shock  Tachycardic on admission. Likely 2/2 to DKA and osmotic diuresis. Now s/p 4L NS total with some improvement.   - Started 0.45% NS @ 250cc/hr per DKA protocol the switched to D5 and 1/2NS for fluid resuscitation   - Monitor vitals  - Monitor UOP  - Holding DVT prophylaxis in the setting of GI bleed   - Rest of plan as above    #Afib  Not on AC at home. Rate controlled on admission.    #HTN    #HLD    PULMONARY  SKYLA  - on 2L NC   -Monitor for pulm edema s/p     GASTROINTESTINAL  #Melena  - 2 episodes in am  - started on IV Pantoprazole 80mg ggt   - f/u FOBT   - GI consulted   - trend Hgb     RENAL  #PINA    INFECTIOUS DISEASE  #SKYLA    ENDOCRINE  #DKA  C/o decreased PO intake and increased thirst. On admission, FSG >600, lactate 9.1, BMP glucose >1000, anion gap 32. Reportedly takes lantus 30 units qhs.   - Follow DKA protocol  - Started insulin 7 gtt, bridged with NPH 15 units, and started on lantus 30 units at bedtime  - Started 0.45% NS @ 250cc/hr and switched to D5 and 1/2NS   - follow up endocrinology recs   - Replete K as needed or add into IVF if <3.5   - c/w ISS     HEME  #SKYLA    PREVENTION  F: s/p 2L NS in ED (1L infiltrated), additional 2L NS given, currently on D5 and 1/2NS   E: Replete to K>4 and Mg>2  N: NPO  DVT ppx: Holding in the setting of GI bleed   GI ppx: Pantoprazole     DISPO: MICU  CODE STATUS: FULL 81yo Italian speaking male with PMHx of HTN, HLD, AFib (not on AC), DM on insulin, presented with AMS, decreased PO intake, and increased thirst for several days. Found to be hyperglycemic with anion gap acidosis, admitted to MICU for DKA.     NEUROLOGY  #Metabolic encephalopathy  Lethargic, opening eyes to verbal stimuli and localizing to painful stimuli. 2/2 to severe metabolic acidosis.  - Improving, A&Ox2     CARDIOVASCULAR  #Hypovolemic shock (resolved)  Tachycardic on admission. Likely 2/2 to DKA and osmotic diuresis. Now s/p 4L NS total with some improvement.   - S/p 0.45% NS @ 250cc/hr per DKA protocol the switched to D5 and 1/2NS for fluid resuscitation   - Monitor vitals  - Monitor UOP  - Rest of plan as above    #Afib  Not on AC at home. Rate controlled on admission.    #HTN  - daughter to bring in home meds    #HLD  - daughter to bring in home meds    PULMONARY  SKYLA    GASTROINTESTINAL  #Melena  Two episodes in am.   - IV PPI BID  - GI consulted   - trend Hgb     RENAL  #PINA  Unclear baseline. Likely ATN 2/2 hypovolemic shock i/s/o DKA.   - Monitor Cr  - Monitor UOP    INFECTIOUS DISEASE  #SKYLA    ENDOCRINE  #DKA (resolved)  C/o decreased PO intake and increased thirst. On admission, FSG >600, lactate 9.1, BMP glucose >1000, anion gap 32. Reportedly takes Lantus 30 units qhs.   - Follow DKA protocol  - Started insulin 7 gtt, bridged with NPH 15 units, and started on lantus 30 units at bedtime  - S/p 0.45% NS @ 250cc/hr and switched to D5 and 1/2NS   - follow up endocrinology recs   - Replete K as needed or add into IVF if <3.5   - c/w ISS     HEME  #SKYLA    PREVENTION  F: s/p 2L NS in ED (1L infiltrated), additional 2L NS given, currently on D5 and 1/2NS   E: Replete to K>4 and Mg>2  N: NPO pending bedside dysphagia  DVT ppx: Holding in the setting of GI bleed   GI ppx: Pantoprazole     DISPO: MICU  CODE STATUS: FULL

## 2022-12-24 NOTE — PROGRESS NOTE ADULT - PROBLEM SELECTOR PLAN 7
Encephalopathic on admission likely 2/2 severe dehydration from HHS with possible contributing uremic encephalopathy or acidosis. Improving and answers many question in English on stepdown but still unaware of location or date. Unclear baseline but may have encephalopath on chronic dementia.  - per MICU discussion with daughter baseline A&Ox4, follow mental status  - TSH Encephalopathic on admission likely 2/2 severe dehydration from HHS with possible contributing uremic encephalopathy or acidosis. Improving and answers many question in English on stepdown but still unaware of location or date. Unclear baseline but may have encephalopath on chronic dementia.  - per MICU discussion with daughter baseline A&Ox4, follow mental status  - TSH  - anticipate passing bedside dysphagia

## 2022-12-24 NOTE — PHYSICAL THERAPY INITIAL EVALUATION ADULT - GAIT DEVIATIONS NOTED, PT EVAL
shuffling gait pattern, kyphotic, LE external rotation/decreased tiffani/decreased step length/decreased stride length/decreased weight-shifting ability

## 2022-12-24 NOTE — PROGRESS NOTE ADULT - SUBJECTIVE AND OBJECTIVE BOX
**Incomplete**    CC: Patient is a 82y old male who presents with ALTERED MENTAL STATUS.    INTERVAL EVENTS: WANDA    SUBJECTIVE / INTERVAL HPI: Patient seen and examined at bedside.     ROS: negative unless otherwise stated above.    VITAL SIGNS:  Vital Signs Last 24 Hrs  T(C): 36.9 (24 Dec 2022 01:09), Max: 37.3 (23 Dec 2022 19:00)  T(F): 98.4 (24 Dec 2022 01:09), Max: 99.2 (23 Dec 2022 19:00)  HR: 85 (24 Dec 2022 06:00) (78 - 112)  BP: 108/59 (24 Dec 2022 04:00) (85/46 - 124/57)  BP(mean): 77 (24 Dec 2022 04:00) (60 - 82)  RR: 16 (24 Dec 2022 06:00) (16 - 24)  SpO2: 100% (24 Dec 2022 06:00) (98% - 100%)    Parameters below as of 24 Dec 2022 04:00  Patient On (Oxygen Delivery Method): room air        22 @ 07:01  -  22 @ 07:00  --------------------------------------------------------  IN: 5712.2 mL / OUT: 1270 mL / NET: 4442.2 mL    22 @ 07:01  -  22 @ 06:11  --------------------------------------------------------  IN: 1695 mL / OUT: 1690 mL / NET: 5 mL        PHYSICAL EXAM:  Constitutional: resting comfortably; NAD  HEENT: NC/AT, PERRL, EOMI, anicteric sclera, MMM  Neck: supple; no JVD or thyromegaly  Respiratory: CTA B/L; no W/R/R, no retractions  Cardiac: +S1/S2; RRR; no M/R/G  Gastrointestinal: soft, NT/ND; no rebound or guarding; +BSx4  Extremities: WWP, no clubbing or cyanosis; no peripheral edema  Musculoskeletal: NROM x4; no joint swelling, tenderness or erythema  Vascular: 2+ radial, DP/PT pulses B/L  Dermatologic: skin warm, dry and intact; no rashes, wounds, or scars  Neurologic: AAOx3, no focal deficits  Psychiatric: calm, cooperative, behaviors are appropriate, denies SI/HI    MEDICATIONS:  MEDICATIONS  (STANDING):  chlorhexidine 4% Liquid 1 Application(s) Topical <User Schedule>  dextrose 5%. 1000 milliLiter(s) (100 mL/Hr) IV Continuous <Continuous>  dextrose 5%. 1000 milliLiter(s) (50 mL/Hr) IV Continuous <Continuous>  dextrose 50% Injectable 25 Gram(s) IV Push once  dextrose 50% Injectable 12.5 Gram(s) IV Push once  dextrose 50% Injectable 25 Gram(s) IV Push once  glucagon  Injectable 1 milliGRAM(s) IntraMuscular once  insulin glargine Injectable (LANTUS) 30 Unit(s) SubCutaneous at bedtime  insulin lispro (ADMELOG) corrective regimen sliding scale   SubCutaneous every 6 hours  pantoprazole Infusion 8 mG/Hr (10 mL/Hr) IV Continuous <Continuous>    MEDICATIONS  (PRN):  dextrose Oral Gel 15 Gram(s) Oral once PRN Blood Glucose LESS THAN 70 milliGRAM(s)/deciliter      ALLERGIES:  Allergies    Allergy Status Unknown    Intolerances        LABS:                        9.2    10.35 )-----------( 131      ( 23 Dec 2022 20:42 )             27.7     12-    140  |  112<H>  |  55<H>  ----------------------------<  246<H>  3.8   |  18<L>  |  1.86<H>    Ca    7.4<L>      23 Dec 2022 20:42  Phos  1.4     12-  Mg     2.2     12-    TPro  5.7<L>  /  Alb  2.5<L>  /  TBili  0.8  /  DBili  x   /  AST  14<L>  /  ALT  13  /  AlkPhos  76  12-    PT/INR - ( 22 Dec 2022 14:18 )   PT: 11.2 sec;   INR: 0.96          PTT - ( 22 Dec 2022 14:18 )  PTT:27.0 sec  Urinalysis Basic - ( 22 Dec 2022 14:18 )    Color: Yellow / Appearance: Clear / S.010 / pH: x  Gluc: x / Ketone: 15 mg/dL  / Bili: Small / Urobili: 0.2 E.U./dL   Blood: x / Protein: 100 mg/dL / Nitrite: NEGATIVE   Leuk Esterase: NEGATIVE / RBC: Many /HPF / WBC < 5 /HPF   Sq Epi: x / Non Sq Epi: 0-5 /HPF / Bacteria: Present /HPF    POCT Blood Glucose.: 183 mg/dL (23 Dec 2022 23:17)      RADIOLOGY & ADDITIONAL TESTS: Reviewed.   *************************TRANSFER FROM MICU TO [facility]**************************  Hospital Course:  The patient is a 81 y/o Thai speaking male with hx of HTN, HLD, AFib, not on AC, DM on insulin, presents with AMS for several days. Per pt's family, pt has been refusing to eat or take any of his meds over the past 2-3 days. Admitted to MICU for DKA management. S/p 4L LR. Started on insulin gtt and 0.45% NS @250cc/hr. AG closed x2, however pt still hyperglycemic to 420-510. Switched to hyperglycemia protocol. 5 beats of VT on tele non sustained. EKG showing nonspecific ST changes, trops 0.08 at 10pm; trop peaked, ekg unchanged. Noted to have 1 episode of melena, consulted GI with no acute intervention indicated. Started IV PPI BID and ordered anemia workup. Bridged with NPH 15u and started on Lantus 30 at bedtime. Stable for stepdown to [facility].    CC: Patient is a 82y old male who presents with ALTERED MENTAL STATUS.    INTERVAL EVENTS: 9pm CBC stable, Hgb 9.2. 6 beats vtach. 1 bloody, mucousy BM.    SUBJECTIVE / INTERVAL HPI: Patient seen and examined at bedside. No complaints at this time. Denies pain, fevers, chills, N/V.     ROS: negative unless otherwise stated above.    VITAL SIGNS:  Vital Signs Last 24 Hrs  T(C): 36.9 (24 Dec 2022 01:09), Max: 37.3 (23 Dec 2022 19:00)  T(F): 98.4 (24 Dec 2022 01:09), Max: 99.2 (23 Dec 2022 19:00)  HR: 85 (24 Dec 2022 06:00) (78 - 112)  BP: 108/59 (24 Dec 2022 04:00) (85/46 - 124/57)  BP(mean): 77 (24 Dec 2022 04:00) (60 - 82)  RR: 16 (24 Dec 2022 06:00) (16 - 24)  SpO2: 100% (24 Dec 2022 06:00) (98% - 100%)    Parameters below as of 24 Dec 2022 04:00  Patient On (Oxygen Delivery Method): room air      22 @ 07:01  -  22 @ 07:00  --------------------------------------------------------  IN: 5712.2 mL / OUT: 1270 mL / NET: 4442.2 mL    22 @ 07:01  -  22 @ 06:11  --------------------------------------------------------  IN: 1695 mL / OUT: 1690 mL / NET: 5 mL      PHYSICAL EXAM:  Constitutional: pleasant Belarusian speaking elderly male resting comfortably; NAD  HEENT: NC/AT, PERRL, anicteric sclera, MMM  Neck: supple  Respiratory: CTA B/L; no W/R/R, no retractions  Cardiac: +S1/S2; RRR; no M/R/G  Gastrointestinal: soft, NT/ND; no rebound or guarding; +BS  Extremities: WWP, no clubbing or cyanosis; no peripheral edema  Vascular: 2+ radial, DP/PT pulses B/L  Dermatologic: skin warm, dry and intact; no rashes, wounds, or scars  Neurologic: AAOx2 to self and hospital, no focal deficits  Psychiatric: calm, cooperative    MEDICATIONS:  MEDICATIONS  (STANDING):  chlorhexidine 4% Liquid 1 Application(s) Topical <User Schedule>  dextrose 5%. 1000 milliLiter(s) (100 mL/Hr) IV Continuous <Continuous>  dextrose 5%. 1000 milliLiter(s) (50 mL/Hr) IV Continuous <Continuous>  dextrose 50% Injectable 25 Gram(s) IV Push once  dextrose 50% Injectable 12.5 Gram(s) IV Push once  dextrose 50% Injectable 25 Gram(s) IV Push once  glucagon  Injectable 1 milliGRAM(s) IntraMuscular once  insulin glargine Injectable (LANTUS) 30 Unit(s) SubCutaneous at bedtime  insulin lispro (ADMELOG) corrective regimen sliding scale   SubCutaneous every 6 hours  pantoprazole Infusion 8 mG/Hr (10 mL/Hr) IV Continuous <Continuous>    MEDICATIONS  (PRN):  dextrose Oral Gel 15 Gram(s) Oral once PRN Blood Glucose LESS THAN 70 milliGRAM(s)/deciliter      ALLERGIES:  Allergies    Allergy Status Unknown    Intolerances        LABS:                        9.2    10.35 )-----------( 131      ( 23 Dec 2022 20:42 )             27.7     12    140  |  112<H>  |  55<H>  ----------------------------<  246<H>  3.8   |  18<L>  |  1.86<H>    Ca    7.4<L>      23 Dec 2022 20:42  Phos  1.4     12  Mg     2.2         TPro  5.7<L>  /  Alb  2.5<L>  /  TBili  0.8  /  DBili  x   /  AST  14<L>  /  ALT  13  /  AlkPhos  76  12-    PT/INR - ( 22 Dec 2022 14:18 )   PT: 11.2 sec;   INR: 0.96          PTT - ( 22 Dec 2022 14:18 )  PTT:27.0 sec  Urinalysis Basic - ( 22 Dec 2022 14:18 )    Color: Yellow / Appearance: Clear / S.010 / pH: x  Gluc: x / Ketone: 15 mg/dL  / Bili: Small / Urobili: 0.2 E.U./dL   Blood: x / Protein: 100 mg/dL / Nitrite: NEGATIVE   Leuk Esterase: NEGATIVE / RBC: Many /HPF / WBC < 5 /HPF   Sq Epi: x / Non Sq Epi: 0-5 /HPF / Bacteria: Present /HPF    POCT Blood Glucose.: 183 mg/dL (23 Dec 2022 23:17)      RADIOLOGY & ADDITIONAL TESTS: Reviewed.

## 2022-12-24 NOTE — PROGRESS NOTE ADULT - PROBLEM SELECTOR PLAN 1
Reports taking lantus 30 or 40 units every evening with no mealtime coverage. A1c on admission 11.9 suggesting poor control. Pending discussion with daughter on compliance issue vs need for escalation. Will likely need basal bolus. On insulin infusion on admission taken off 12/23 AM bridged with NPH to lantus 30U 12/23 qHS.  - per MICU, endocrinology consulted -- f/u recs  - pending discussion with daughter about home regimen Reports taking lantus 30 or 40 units every evening with no mealtime coverage. A1c on admission 11.9 suggesting poor control. Pending discussion with daughter on compliance issue vs need for escalation. Will likely need basal bolus. On insulin infusion on admission taken off 12/23 AM bridged with NPH to lantus 30U 12/23 qHS.  - per MICU, endocrinology consulted -- f/u recs  - daughter confirmed once daily insulin but didn't know other details. Likely takes oral DM meds also  - once eating, will empirically start 10U with meals  - c/w 30U lantus qHS  - c/w ISS  - consider inpt SGLT2

## 2022-12-24 NOTE — PROGRESS NOTE ADULT - PROBLEM SELECTOR PLAN 3
History of a fib on no rate control with intermittent sinus tachycardia 2/2 severe dehydration. Normal sinus on transfer from MICU to [facility] with daughter History of a fib on no rate control with intermittent sinus tachycardia 2/2 severe dehydration. Normal sinus on transfer from MICU to [facility]. Daughter states he was on a blood thinner but unclear if this was DOAC vs antiplatelet agent. CHADSVASC 5 so AC indicated, but with recent melena would likely be started as an outpatient.  - collat with daughter  - consider rate control  - hold AC pending melena eval

## 2022-12-24 NOTE — PATIENT PROFILE ADULT - FALL HARM RISK - HARM RISK INTERVENTIONS
Communicate Risk of Fall with Harm to all staff/Reinforce activity limits and safety measures with patient and family/Tailored Fall Risk Interventions/Visual Cue: Yellow wristband and red socks/Bed in lowest position, wheels locked, appropriate side rails in place/Call bell, personal items and telephone in reach/Instruct patient to call for assistance before getting out of bed or chair/Non-slip footwear when patient is out of bed/[place] to call system/Physically safe environment - no spills, clutter or unnecessary equipment/Purposeful Proactive Rounding/Room/bathroom lighting operational, light cord in reach Assistance with ambulation/Assistance OOB with selected safe patient handling equipment/Communicate Risk of Fall with Harm to all staff/Reinforce activity limits and safety measures with patient and family/Tailored Fall Risk Interventions/Visual Cue: Yellow wristband and red socks/Bed in lowest position, wheels locked, appropriate side rails in place/Call bell, personal items and telephone in reach/Instruct patient to call for assistance before getting out of bed or chair/Non-slip footwear when patient is out of bed/[place] to call system/Physically safe environment - no spills, clutter or unnecessary equipment/Purposeful Proactive Rounding/Room/bathroom lighting operational, light cord in reach

## 2022-12-24 NOTE — PROGRESS NOTE ADULT - CONVERSATION DETAILS
Discussed how he is doing better and has a very good chance of returning to independence, but with his heart failure if he were to become sicker his prognosis could turn very quickly. [facility], and his daughter states that she is his only family so is the default surrogate + verbally designated proxy. She has talked with him about whether he would want prolonged mechanical life support; he would want resuscitation to reverse temporary matters, but would want to transition to comfort based care if returning to any degree of independence is futile.

## 2022-12-24 NOTE — PHYSICAL THERAPY INITIAL EVALUATION ADULT - MANUAL MUSCLE TESTING RESULTS, REHAB EVAL
UE/LE strength grossly assessed with functional mobility. Bilaterally pt strength is equal/greater than 3-/5.

## 2022-12-24 NOTE — PHYSICAL THERAPY INITIAL EVALUATION ADULT - ADDITIONAL COMMENTS
Details to be confirmed with case mgmt/social work as pt is not a poor historian. Pt reports he stays home and his daughter and her boyfriend help him as needed within the home. He declines being home alone at any time.

## 2022-12-24 NOTE — PHYSICAL THERAPY INITIAL EVALUATION ADULT - PHYSICAL ASSIST/NONPHYSICAL ASSIST: GAIT, REHAB EVAL
cues for step length; improves moderately towards end of gait assessment./set-up required/verbal cues/nonverbal cues (demo/gestures)/1 person assist

## 2022-12-24 NOTE — PROGRESS NOTE ADULT - PROBLEM SELECTOR PLAN 4
Likely chronic issue as patient reports that he was due for colonoscopy despite being age 82. Pending discussion with daughter.  GI consulted by MICU and recommended outpatient EGD/colo  - c/w PPI bid pending f/u with outpatient  - f/u B12, folate, TSH given macrocytosis to ensure he is optimized to retic Likely subacute-chronic issue as patient reports that he was due for colonoscopy despite being age 82. Pending discussion with daughter.  GI consulted by MICU and recommended outpatient EGD/colo  - c/w PPI bid pending f/u with outpatient  - f/u B12, folate, TSH given macrocytosis to ensure he is optimized to retic appropriately

## 2022-12-24 NOTE — PROGRESS NOTE ADULT - ATTENDING COMMENTS
81 yo M, minimal collateral, AMS so unable to provide hx, admitted for DKA/HHS, severe lactic acidosis, PINA, and melena. AG closed, FSG significantly improved, bridged to NPH, continue with fluid resuscitation for HHS. Two episodes of dark tarry stools but no significant change in H/H, remains stable this AM. Mental status improving, lactate cleared, UOP remains robust.  Unclear etiology for DKA - no obvious infectious etiology, but low threshold to begin empiric abx if any leukocytosis, fever, or change in  hemodynamic. AMS suggesting aspiration risk, but on 2L NC and no respiratory distress. Bedside dysphagia and advance diet given improved mental status. Will continue to hold a/c and dvt ppx in setting of GIB, but reintroduce if stable by tomorrow. HFrEF, daughter to bring in med rec today. Initiated discussion re: GOC - daughter feels she understands her father has heart failure and chance of recovery if he were to have an acute respiratory or cardiac event is low, but after discussions with him he would want a trial of resuscitation/intubation. Transfer to [facility].

## 2022-12-24 NOTE — PHYSICAL THERAPY INITIAL EVALUATION ADULT - TRANSFER SAFETY CONCERNS NOTED: SIT/STAND, REHAB EVAL
Pt performed 2 trials of STS from EOB (1x normal seat height). Difficulty attaining standing upright posture. Improves with elevated seat height; pt also with fecal incontinence upon standing./decreased weight-shifting ability

## 2022-12-24 NOTE — PROGRESS NOTE ADULT - PROBLEM SELECTOR PLAN 2
Charted Hx of HTN, pending collateral. Normotensive in [facility] likely 2/2 renal failure and severe dehydration from HHS Charted Hx of HTN, pending collateral. Normotensive in [facility] likely 2/2 renal failure and severe dehydration from HHS.  - f/u med rec

## 2022-12-24 NOTE — PHYSICAL THERAPY INITIAL EVALUATION ADULT - IMPAIRMENTS FOUND, PT EVAL
aerobic capacity/endurance/gait, locomotion, and balance/muscle strength/neuromotor development and sensory integration/poor safety awareness/posture/ROM

## 2022-12-24 NOTE — PROGRESS NOTE ADULT - PROBLEM SELECTOR PLAN 8
F: none  E: replete K<4.0, Mg<2.0, Phos<2.5  N: NPO pending speech and swallow, then CC  DVT prophylaxis: heparin  Access: PIV  Code Status: assume full F: none  E: replete K<4.0, Mg<2.0, Phos<2.5  N: NPO pending speech and swallow, then CC  DVT prophylaxis: SCDs  Access: PIV  Code Status: full F: none  E: replete K<4.0, Mg<2.0, Phos<2.5  N: NPO pending dysphagia screen  DVT prophylaxis: SCDs  Access: PIV  Code Status: full

## 2022-12-24 NOTE — PROGRESS NOTE ADULT - PROBLEM SELECTOR PLAN 6
Creatinine 4.73 on admission. This is assuredly prerenal PINA 2/2 poor renal perfusion from DKA/HHS, as also markedly hypernatremic on admission (uncorrected 138) and rapid improvement with hypotonic fluid resuscitation. Cr 1.71 on transfer from MICU to [facility], pending collateral from daughter on baseline kidney function but anticipate PINA on CKD given history of HTN and uncontrolled DM.  - avoid contrast if able  - care with fluids given down EF, appears euvolemic on stepdown  - [facility] likely [facility] with [facility] or [facility]

## 2022-12-25 LAB
ANION GAP SERPL CALC-SCNC: 8 MMOL/L — SIGNIFICANT CHANGE UP (ref 5–17)
ANISOCYTOSIS BLD QL: SLIGHT — SIGNIFICANT CHANGE UP
BASOPHILS # BLD AUTO: 0.07 K/UL — SIGNIFICANT CHANGE UP (ref 0–0.2)
BASOPHILS NFR BLD AUTO: 1.8 % — SIGNIFICANT CHANGE UP (ref 0–2)
BUN SERPL-MCNC: 31 MG/DL — HIGH (ref 7–23)
BURR CELLS BLD QL SMEAR: PRESENT — SIGNIFICANT CHANGE UP
CALCIUM SERPL-MCNC: 8.5 MG/DL — SIGNIFICANT CHANGE UP (ref 8.4–10.5)
CHLORIDE SERPL-SCNC: 116 MMOL/L — HIGH (ref 96–108)
CO2 SERPL-SCNC: 22 MMOL/L — SIGNIFICANT CHANGE UP (ref 22–31)
CREAT SERPL-MCNC: 1.42 MG/DL — HIGH (ref 0.5–1.3)
EGFR: 49 ML/MIN/1.73M2 — LOW
EOSINOPHIL # BLD AUTO: 0.07 K/UL — SIGNIFICANT CHANGE UP (ref 0–0.5)
EOSINOPHIL NFR BLD AUTO: 1.8 % — SIGNIFICANT CHANGE UP (ref 0–6)
FERRITIN SERPL-MCNC: 55 NG/ML — SIGNIFICANT CHANGE UP (ref 30–400)
GIANT PLATELETS BLD QL SMEAR: PRESENT — SIGNIFICANT CHANGE UP
GLUCOSE BLDC GLUCOMTR-MCNC: 146 MG/DL — HIGH (ref 70–99)
GLUCOSE BLDC GLUCOMTR-MCNC: 241 MG/DL — HIGH (ref 70–99)
GLUCOSE BLDC GLUCOMTR-MCNC: 299 MG/DL — HIGH (ref 70–99)
GLUCOSE BLDC GLUCOMTR-MCNC: 308 MG/DL — HIGH (ref 70–99)
GLUCOSE SERPL-MCNC: 159 MG/DL — HIGH (ref 70–99)
HCT VFR BLD CALC: 34.1 % — LOW (ref 39–50)
HGB BLD-MCNC: 10.7 G/DL — LOW (ref 13–17)
IRON SATN MFR SERPL: 15 % — LOW (ref 16–55)
IRON SATN MFR SERPL: 22 UG/DL — LOW (ref 45–165)
LYMPHOCYTES # BLD AUTO: 0.53 K/UL — LOW (ref 1–3.3)
LYMPHOCYTES # BLD AUTO: 13.3 % — SIGNIFICANT CHANGE UP (ref 13–44)
MACROCYTES BLD QL: SLIGHT — SIGNIFICANT CHANGE UP
MAGNESIUM SERPL-MCNC: 2.3 MG/DL — SIGNIFICANT CHANGE UP (ref 1.6–2.6)
MANUAL SMEAR VERIFICATION: SIGNIFICANT CHANGE UP
MCHC RBC-ENTMCNC: 31.4 GM/DL — LOW (ref 32–36)
MCHC RBC-ENTMCNC: 32.9 PG — SIGNIFICANT CHANGE UP (ref 27–34)
MCV RBC AUTO: 104.9 FL — HIGH (ref 80–100)
MICROCYTES BLD QL: SLIGHT — SIGNIFICANT CHANGE UP
MONOCYTES # BLD AUTO: 0.32 K/UL — SIGNIFICANT CHANGE UP (ref 0–0.9)
MONOCYTES NFR BLD AUTO: 7.9 % — SIGNIFICANT CHANGE UP (ref 2–14)
NEUTROPHILS # BLD AUTO: 3 K/UL — SIGNIFICANT CHANGE UP (ref 1.8–7.4)
NEUTROPHILS NFR BLD AUTO: 75.2 % — SIGNIFICANT CHANGE UP (ref 43–77)
OVALOCYTES BLD QL SMEAR: SLIGHT — SIGNIFICANT CHANGE UP
PHOSPHATE SERPL-MCNC: 2.5 MG/DL — SIGNIFICANT CHANGE UP (ref 2.5–4.5)
PLAT MORPH BLD: ABNORMAL
PLATELET # BLD AUTO: 139 K/UL — LOW (ref 150–400)
POIKILOCYTOSIS BLD QL AUTO: SIGNIFICANT CHANGE UP
POLYCHROMASIA BLD QL SMEAR: SLIGHT — SIGNIFICANT CHANGE UP
POTASSIUM SERPL-MCNC: 3.9 MMOL/L — SIGNIFICANT CHANGE UP (ref 3.5–5.3)
POTASSIUM SERPL-SCNC: 3.9 MMOL/L — SIGNIFICANT CHANGE UP (ref 3.5–5.3)
RBC # BLD: 3.25 M/UL — LOW (ref 4.2–5.8)
RBC # FLD: 15.1 % — HIGH (ref 10.3–14.5)
RBC BLD AUTO: ABNORMAL
SODIUM SERPL-SCNC: 146 MMOL/L — HIGH (ref 135–145)
SPHEROCYTES BLD QL SMEAR: SLIGHT — SIGNIFICANT CHANGE UP
TIBC SERPL-MCNC: 145 UG/DL — LOW (ref 220–430)
UIBC SERPL-MCNC: 123 UG/DL — SIGNIFICANT CHANGE UP (ref 110–370)
WBC # BLD: 3.99 K/UL — SIGNIFICANT CHANGE UP (ref 3.8–10.5)
WBC # FLD AUTO: 3.99 K/UL — SIGNIFICANT CHANGE UP (ref 3.8–10.5)

## 2022-12-25 PROCEDURE — 99232 SBSQ HOSP IP/OBS MODERATE 35: CPT | Mod: GC

## 2022-12-25 RX ORDER — INSULIN LISPRO 100/ML
5 VIAL (ML) SUBCUTANEOUS
Refills: 0 | Status: DISCONTINUED | OUTPATIENT
Start: 2022-12-25 | End: 2022-12-28

## 2022-12-25 RX ADMIN — PANTOPRAZOLE SODIUM 40 MILLIGRAM(S): 20 TABLET, DELAYED RELEASE ORAL at 17:20

## 2022-12-25 RX ADMIN — INSULIN GLARGINE 20 UNIT(S): 100 INJECTION, SOLUTION SUBCUTANEOUS at 21:45

## 2022-12-25 RX ADMIN — Medication 12.5 MILLIGRAM(S): at 08:58

## 2022-12-25 RX ADMIN — Medication 4: at 21:44

## 2022-12-25 RX ADMIN — Medication 6: at 12:15

## 2022-12-25 RX ADMIN — Medication 8: at 17:29

## 2022-12-25 RX ADMIN — PANTOPRAZOLE SODIUM 40 MILLIGRAM(S): 20 TABLET, DELAYED RELEASE ORAL at 06:33

## 2022-12-25 RX ADMIN — Medication 12.5 MILLIGRAM(S): at 21:49

## 2022-12-25 NOTE — PROGRESS NOTE ADULT - PROBLEM SELECTOR PLAN 4
Likely subacute-chronic issue as patient reports that he was due for colonoscopy despite being age 82. GI consulted by MICU and recommended outpatient EGD/colo  - c/w PPI bid pending f/u with outpatient  - f/u B12, folate, TSH given macrocytosis to ensure he is optimized to retic appropriately

## 2022-12-25 NOTE — PROGRESS NOTE ADULT - ASSESSMENT
82 M with PMHx HTN, IDDM, a fib, HFrEF, admitted to MICU for overlap DKA/HHS, stepped down to RMF on 12/24.

## 2022-12-25 NOTE — SWALLOW BEDSIDE ASSESSMENT ADULT - COMMENTS
Per RN, night nurse reported coughing with liquids, however she has not seen him cough with liquids today.

## 2022-12-25 NOTE — SWALLOW BEDSIDE ASSESSMENT ADULT - SLP PERTINENT HISTORY OF CURRENT PROBLEM
The patient is a 81 y/o [place]ian speaking male with hx of HTN, HLD, AFib, not on AC, DM on insulin, presents with AMS for several days.

## 2022-12-25 NOTE — PROGRESS NOTE ADULT - ATTENDING COMMENTS
82 M with PMHx HTN, IDDM, a fib, HFrEF, admitted to MICU for overlap DKA/HHS, stepped down to RMF on 12/24.    Plan  -poorly controlled DM; appreciate endo recs; c/w current insulin regimen  -HFrEF, chronic as per daughter; will confirm med rec; low dose lopressor well-tolerated, will titrate to toprol in AM; hold off on ACE/ARB pending PINA resolution  -pAF, holding AC in the setting of melena; starting BB  -GIB, appreciate GI recs, c/w holding AC  -avoid nephrotoxins  -dysphagia failed, s/s pending  -will eventually require ICD eval if EF remains reduced despite GDMT

## 2022-12-25 NOTE — PROGRESS NOTE ADULT - PROBLEM SELECTOR PLAN 7
Encephalopathic on admission likely 2/2 severe dehydration from HHS with possible contributing uremic encephalopathy or acidosis. Improving and answers many question in English on stepdown but still unaware of location or date. Unclear baseline but may have encephalopath on chronic dementia.  - per MICU discussion with daughter baseline A&Ox4, follow mental status  - TSH  - Passed bedside dysphagia Encephalopathic on admission likely 2/2 severe dehydration from HHS with possible contributing uremic encephalopathy or acidosis. Improving and answers many question in English on stepdown but still unaware of location or date. Unclear baseline but may have encephalopath on chronic dementia.  - per MICU discussion with daughter baseline A&Ox4, follow mental status  - TSH  - Passed bedside dysphagia, however difficulty with solid foods. F/u formal speech/swallow eval

## 2022-12-25 NOTE — SWALLOW BEDSIDE ASSESSMENT ADULT - ORAL PHASE
Unremarkable with liquids and purees. Chewable solids not trialed 2/2 pt refusal, stating "I don't my teeth"/Within functional limits

## 2022-12-25 NOTE — PROGRESS NOTE ADULT - PROBLEM SELECTOR PLAN 2
Charted Hx of HTN, pending collateral. Normotensive in [facility] likely 2/2 renal failure and severe dehydration from HHS.  - f/u med rec

## 2022-12-25 NOTE — PROGRESS NOTE ADULT - PROBLEM SELECTOR PLAN 6
Creatinine 4.73 on admission. Suspect prerenal PINA 2/2 poor renal perfusion from DKA/HHS, as also markedly hypernatremic on admission (uncorrected 138) and rapid improvement with hypotonic fluid resuscitation. Cr 1.71 on transfer from MICU to [facility], pending collateral from daughter on baseline kidney function but anticipate PNIA on CKD given history of HTN and uncontrolled DM.  - avoid contrast if able  - care with fluids given down EF, appears euvolemic on stepdown  - can likely f/u with [facility] or nephrology

## 2022-12-25 NOTE — PROGRESS NOTE ADULT - PROBLEM SELECTOR PLAN 3
History of a fib on no rate control with intermittent sinus tachycardia 2/2 severe dehydration. Normal sinus on transfer from MICU to [facility]. Daughter states he was on a blood thinner but unclear if this was DOAC vs antiplatelet agent. CHADSVASC 5 so AC indicated, but with recent melena would likely be started as an outpatient.  - c/w lopressor 12.5mg BID  - hold AC pending melena eval History of a fib on no rate control with intermittent sinus tachycardia 2/2 severe dehydration. Normal sinus on transfer from MICU to [facility]. Daughter states he was on a blood thinner but unclear if this was DOAC vs antiplatelet agent. CHADSVASC 5 so AC indicated, but with recent melena would likely be started as an outpatient.  - c/w lopressor 12.5mg BID, will transition to once daily regimen pending formal med rec  - hold AC pending melena eval

## 2022-12-25 NOTE — SWALLOW BEDSIDE ASSESSMENT ADULT - ADDITIONAL RECOMMENDATIONS
If patient is observed coughing or demonstrating other signs of aspiration with PO, please D/C diet and reconsult this svc.

## 2022-12-25 NOTE — SWALLOW BEDSIDE ASSESSMENT ADULT - ASR SWALLOW DENTITION
Has a few sparse teeth, however not functional for mastication of chewable solids./edentulous, does not have dentures

## 2022-12-25 NOTE — PROGRESS NOTE ADULT - PROBLEM SELECTOR PLAN 8
F: none  E: replete K<4.0, Mg<2.0, Phos<2.5  N: Consistent carbs  DVT prophylaxis: SCDs  Access: PIV  Code Status: full F: none  E: replete K<4.0, Mg<2.0, Phos<2.5  N: Puree   DVT prophylaxis: SCDs  Access: PIV  Code Status: full

## 2022-12-25 NOTE — SWALLOW BEDSIDE ASSESSMENT ADULT - SWALLOW EVAL: DIAGNOSIS
Patient presents with moderate oral dysphagia 2/2 lack of dentition. Pharyngeal phase appeared WFL at time of eval. Pt is at heightened risk of aspiration given advanced age, reduced mobility, and confusion, therefore recommend modified diet of puree with thin liquids. Maintain strict aspiration precautions.

## 2022-12-25 NOTE — PROGRESS NOTE ADULT - PROBLEM SELECTOR PLAN 5
Unclear if acute 2/2 acute stress c/b poor preload from severe dehydration vs chronic HFrEF. Given degree of renal impairment, not good candidate for hormonal blockade GDMT but good candidate for beta blockade given normotension and concurrent a fib. Daughter knows he was on a diuretic as he was hospitalized in November with fluid in his legs and his lungs, but doesn't remember other details.  - pending med rec  - c/w lopressor 12.5mg BID  - [facility]  - euvolemic, no indication for diuresis  - consider ACE/ARB/ARNi once Cr plateaued  - consider inpt SGLT2 for triple benefit HF/DM/CKD once Cr at baseline

## 2022-12-25 NOTE — PROGRESS NOTE ADULT - PROBLEM SELECTOR PLAN 1
Reports taking lantus 30 or 40 units every evening with no mealtime coverage. A1c on admission 11.9 suggesting poor control. Pending discussion with daughter on compliance issue vs need for escalation. Will likely need basal bolus. On insulin infusion on admission taken off 12/23 AM bridged with NPH to lantus 30U 12/23 qHS.  - per MICU, endocrinology consulted -- f/u recs  - daughter confirmed once daily insulin but didn't know other details. Likely takes oral DM meds also  - once eating, will empirically start 10U with meals  - c/w 30U lantus qHS  - c/w ISS  - consider inpt SGLT2 Reports taking lantus 30 or 40 units every evening with no mealtime coverage. A1c on admission 11.9 suggesting poor control. Pending discussion with daughter on compliance issue vs need for escalation. Will likely need basal bolus. On insulin infusion on admission taken off 12/23 AM bridged with NPH to lantus 30U 12/23 qHS.  - per MICU, endocrinology consulted -- f/u recs  - daughter confirmed once daily insulin but didn't know other details. Likely takes oral DM meds also  - once eating, will empirically start 10U with meals  - c/w 20U lantus qHS  - c/w ISS Reports taking lantus 30 or 40 units every evening with no mealtime coverage. A1c on admission 11.9 suggesting poor control. Pending discussion with daughter on compliance issue vs need for escalation. Will likely need basal bolus. On insulin infusion on admission taken off 12/23 AM bridged with NPH to lantus 30U 12/23 qHS.  - per MICU, endocrinology consulted -- f/u recs  - f/u med rec with daughter  - monitor pt PO intake, if sugars are elevated can start pre-meal insulin  - c/w 20U lantus qHS  - c/w ISS

## 2022-12-25 NOTE — SWALLOW BEDSIDE ASSESSMENT ADULT - SWALLOW EVAL: RECOMMENDED FEEDING/EATING TECHNIQUES
allow for swallow between intakes/alternate food with liquid/oral hygiene/position upright (90 degrees)/small sips/bites/tuck chin

## 2022-12-25 NOTE — SWALLOW BEDSIDE ASSESSMENT ADULT - PHARYNGEAL PHASE
Hyolaryngeal elevation and excursion palpated. No overt signs of airway protection deficits at time of eval./Within functional limits

## 2022-12-26 LAB
ANION GAP SERPL CALC-SCNC: 9 MMOL/L — SIGNIFICANT CHANGE UP (ref 5–17)
BASOPHILS # BLD AUTO: 0.02 K/UL — SIGNIFICANT CHANGE UP (ref 0–0.2)
BASOPHILS NFR BLD AUTO: 0.5 % — SIGNIFICANT CHANGE UP (ref 0–2)
BUN SERPL-MCNC: 24 MG/DL — HIGH (ref 7–23)
CALCIUM SERPL-MCNC: 8.4 MG/DL — SIGNIFICANT CHANGE UP (ref 8.4–10.5)
CHLORIDE SERPL-SCNC: 116 MMOL/L — HIGH (ref 96–108)
CO2 SERPL-SCNC: 22 MMOL/L — SIGNIFICANT CHANGE UP (ref 22–31)
CREAT SERPL-MCNC: 1.32 MG/DL — HIGH (ref 0.5–1.3)
EGFR: 54 ML/MIN/1.73M2 — LOW
EOSINOPHIL # BLD AUTO: 0.03 K/UL — SIGNIFICANT CHANGE UP (ref 0–0.5)
EOSINOPHIL NFR BLD AUTO: 0.7 % — SIGNIFICANT CHANGE UP (ref 0–6)
FOLATE SERPL-MCNC: 14.8 NG/ML — SIGNIFICANT CHANGE UP
GLUCOSE BLDC GLUCOMTR-MCNC: 138 MG/DL — HIGH (ref 70–99)
GLUCOSE BLDC GLUCOMTR-MCNC: 190 MG/DL — HIGH (ref 70–99)
GLUCOSE BLDC GLUCOMTR-MCNC: 201 MG/DL — HIGH (ref 70–99)
GLUCOSE BLDC GLUCOMTR-MCNC: 212 MG/DL — HIGH (ref 70–99)
GLUCOSE BLDC GLUCOMTR-MCNC: 65 MG/DL — LOW (ref 70–99)
GLUCOSE SERPL-MCNC: 66 MG/DL — LOW (ref 70–99)
HCT VFR BLD CALC: 31.2 % — LOW (ref 39–50)
HGB BLD-MCNC: 10 G/DL — LOW (ref 13–17)
IMM GRANULOCYTES NFR BLD AUTO: 1.7 % — HIGH (ref 0–0.9)
LYMPHOCYTES # BLD AUTO: 0.61 K/UL — LOW (ref 1–3.3)
LYMPHOCYTES # BLD AUTO: 15 % — SIGNIFICANT CHANGE UP (ref 13–44)
MAGNESIUM SERPL-MCNC: 2.1 MG/DL — SIGNIFICANT CHANGE UP (ref 1.6–2.6)
MCHC RBC-ENTMCNC: 32.1 GM/DL — SIGNIFICANT CHANGE UP (ref 32–36)
MCHC RBC-ENTMCNC: 33.3 PG — SIGNIFICANT CHANGE UP (ref 27–34)
MCV RBC AUTO: 104 FL — HIGH (ref 80–100)
MONOCYTES # BLD AUTO: 0.53 K/UL — SIGNIFICANT CHANGE UP (ref 0–0.9)
MONOCYTES NFR BLD AUTO: 13 % — SIGNIFICANT CHANGE UP (ref 2–14)
NEUTROPHILS # BLD AUTO: 2.82 K/UL — SIGNIFICANT CHANGE UP (ref 1.8–7.4)
NEUTROPHILS NFR BLD AUTO: 69.1 % — SIGNIFICANT CHANGE UP (ref 43–77)
NRBC # BLD: 0 /100 WBCS — SIGNIFICANT CHANGE UP (ref 0–0)
PHOSPHATE SERPL-MCNC: 2 MG/DL — LOW (ref 2.5–4.5)
PLATELET # BLD AUTO: 144 K/UL — LOW (ref 150–400)
POTASSIUM SERPL-MCNC: 3.5 MMOL/L — SIGNIFICANT CHANGE UP (ref 3.5–5.3)
POTASSIUM SERPL-SCNC: 3.5 MMOL/L — SIGNIFICANT CHANGE UP (ref 3.5–5.3)
RBC # BLD: 3 M/UL — LOW (ref 4.2–5.8)
RBC # FLD: 14.6 % — HIGH (ref 10.3–14.5)
SODIUM SERPL-SCNC: 147 MMOL/L — HIGH (ref 135–145)
VIT B12 SERPL-MCNC: 1624 PG/ML — HIGH (ref 232–1245)
WBC # BLD: 4.08 K/UL — SIGNIFICANT CHANGE UP (ref 3.8–10.5)
WBC # FLD AUTO: 4.08 K/UL — SIGNIFICANT CHANGE UP (ref 3.8–10.5)

## 2022-12-26 PROCEDURE — 99235 HOSP IP/OBS SAME DATE MOD 70: CPT

## 2022-12-26 PROCEDURE — 99233 SBSQ HOSP IP/OBS HIGH 50: CPT | Mod: GC

## 2022-12-26 RX ORDER — FOLIC ACID 0.8 MG
1 TABLET ORAL DAILY
Refills: 0 | Status: DISCONTINUED | OUTPATIENT
Start: 2022-12-27 | End: 2022-12-28

## 2022-12-26 RX ORDER — ASCORBIC ACID 60 MG
500 TABLET,CHEWABLE ORAL DAILY
Refills: 0 | Status: DISCONTINUED | OUTPATIENT
Start: 2022-12-27 | End: 2022-12-28

## 2022-12-26 RX ORDER — METOPROLOL TARTRATE 50 MG
25 TABLET ORAL EVERY 24 HOURS
Refills: 0 | Status: DISCONTINUED | OUTPATIENT
Start: 2022-12-26 | End: 2022-12-26

## 2022-12-26 RX ORDER — TAMSULOSIN HYDROCHLORIDE 0.4 MG/1
0.4 CAPSULE ORAL AT BEDTIME
Refills: 0 | Status: DISCONTINUED | OUTPATIENT
Start: 2022-12-26 | End: 2022-12-28

## 2022-12-26 RX ORDER — ASCORBIC ACID 60 MG
1 TABLET,CHEWABLE ORAL
Qty: 0 | Refills: 0 | DISCHARGE

## 2022-12-26 RX ORDER — SPIRONOLACTONE 25 MG/1
0.5 TABLET, FILM COATED ORAL
Qty: 0 | Refills: 0 | DISCHARGE

## 2022-12-26 RX ORDER — ATORVASTATIN CALCIUM 80 MG/1
1 TABLET, FILM COATED ORAL
Qty: 0 | Refills: 0 | DISCHARGE

## 2022-12-26 RX ORDER — CARVEDILOL PHOSPHATE 80 MG/1
6.25 CAPSULE, EXTENDED RELEASE ORAL EVERY 12 HOURS
Refills: 0 | Status: DISCONTINUED | OUTPATIENT
Start: 2022-12-27 | End: 2022-12-28

## 2022-12-26 RX ORDER — PANTOPRAZOLE SODIUM 20 MG/1
40 TABLET, DELAYED RELEASE ORAL EVERY 12 HOURS
Refills: 0 | Status: DISCONTINUED | OUTPATIENT
Start: 2022-12-26 | End: 2022-12-28

## 2022-12-26 RX ORDER — TAMSULOSIN HYDROCHLORIDE 0.4 MG/1
1 CAPSULE ORAL
Qty: 0 | Refills: 0 | DISCHARGE

## 2022-12-26 RX ORDER — INSULIN GLARGINE 100 [IU]/ML
15 INJECTION, SOLUTION SUBCUTANEOUS AT BEDTIME
Refills: 0 | Status: DISCONTINUED | OUTPATIENT
Start: 2022-12-26 | End: 2022-12-28

## 2022-12-26 RX ORDER — SODIUM,POTASSIUM PHOSPHATES 278-250MG
1 POWDER IN PACKET (EA) ORAL ONCE
Refills: 0 | Status: COMPLETED | OUTPATIENT
Start: 2022-12-26 | End: 2022-12-26

## 2022-12-26 RX ORDER — ATORVASTATIN CALCIUM 80 MG/1
40 TABLET, FILM COATED ORAL AT BEDTIME
Refills: 0 | Status: DISCONTINUED | OUTPATIENT
Start: 2022-12-26 | End: 2022-12-28

## 2022-12-26 RX ORDER — BACITRACIN ZINC 500 UNIT/G
1 OINTMENT IN PACKET (EA) TOPICAL
Refills: 0 | Status: DISCONTINUED | OUTPATIENT
Start: 2022-12-26 | End: 2022-12-28

## 2022-12-26 RX ORDER — POTASSIUM CHLORIDE 20 MEQ
40 PACKET (EA) ORAL ONCE
Refills: 0 | Status: COMPLETED | OUTPATIENT
Start: 2022-12-26 | End: 2022-12-26

## 2022-12-26 RX ORDER — CARVEDILOL PHOSPHATE 80 MG/1
1 CAPSULE, EXTENDED RELEASE ORAL
Qty: 0 | Refills: 0 | DISCHARGE

## 2022-12-26 RX ORDER — FOLIC ACID 0.8 MG
1 TABLET ORAL
Qty: 0 | Refills: 0 | DISCHARGE

## 2022-12-26 RX ADMIN — ATORVASTATIN CALCIUM 40 MILLIGRAM(S): 80 TABLET, FILM COATED ORAL at 21:49

## 2022-12-26 RX ADMIN — Medication 2: at 21:48

## 2022-12-26 RX ADMIN — Medication 5 UNIT(S): at 12:18

## 2022-12-26 RX ADMIN — PANTOPRAZOLE SODIUM 40 MILLIGRAM(S): 20 TABLET, DELAYED RELEASE ORAL at 06:45

## 2022-12-26 RX ADMIN — Medication 4: at 17:07

## 2022-12-26 RX ADMIN — Medication 650 MILLIGRAM(S): at 05:02

## 2022-12-26 RX ADMIN — Medication 40 MILLIEQUIVALENT(S): at 09:13

## 2022-12-26 RX ADMIN — INSULIN GLARGINE 15 UNIT(S): 100 INJECTION, SOLUTION SUBCUTANEOUS at 21:49

## 2022-12-26 RX ADMIN — Medication 25 MILLIGRAM(S): at 09:12

## 2022-12-26 RX ADMIN — Medication 5 UNIT(S): at 17:06

## 2022-12-26 RX ADMIN — Medication 1 APPLICATION(S): at 17:11

## 2022-12-26 RX ADMIN — Medication 4: at 12:18

## 2022-12-26 RX ADMIN — Medication 650 MILLIGRAM(S): at 05:00

## 2022-12-26 RX ADMIN — TAMSULOSIN HYDROCHLORIDE 0.4 MILLIGRAM(S): 0.4 CAPSULE ORAL at 21:56

## 2022-12-26 RX ADMIN — Medication 1 PACKET(S): at 09:13

## 2022-12-26 RX ADMIN — Medication 650 MILLIGRAM(S): at 15:05

## 2022-12-26 RX ADMIN — Medication 650 MILLIGRAM(S): at 14:05

## 2022-12-26 RX ADMIN — PANTOPRAZOLE SODIUM 40 MILLIGRAM(S): 20 TABLET, DELAYED RELEASE ORAL at 17:07

## 2022-12-26 NOTE — PROGRESS NOTE ADULT - PROBLEM SELECTOR PLAN 3
Given degree of renal impairment, not good candidate for hormonal blockade GDMT.   Daughter knows he was on a diuretic as he was hospitalized in November with fluid in his legs and his lungs, but doesn't remember other details.    - med rec with daughter  - c/w toprol 25 qd  - f/u cardiology outpatient with [facility]  - euvolemic, no indication for diuresis  - consider ACE/ARB/ARNi once Cr plateaued  - consider inpt SGLT2 for triple benefit HF/DM/CKD once Cr at baseline

## 2022-12-26 NOTE — PROVIDER CONTACT NOTE (HYPOGLYCEMIA EVENT) - NS PROVIDER CONTACT BACKGROUND-HYPO
Age: 82y    Gender: Male    POCT Blood Glucose:  65 mg/dL (12-26-22 @ 08:16)  241 mg/dL (12-25-22 @ 21:25)  308 mg/dL (12-25-22 @ 17:09)  299 mg/dL (12-25-22 @ 12:05)      eMAR:  insulin glargine Injectable (LANTUS)   20 Unit(s) SubCutaneous (12-25-22 @ 21:45)    insulin lispro (ADMELOG) corrective regimen sliding scale   4 Unit(s) SubCutaneous (12-25-22 @ 21:44)   8 Unit(s) SubCutaneous (12-25-22 @ 17:29)   6 Unit(s) SubCutaneous (12-25-22 @ 12:15)

## 2022-12-26 NOTE — PROGRESS NOTE ADULT - PROBLEM SELECTOR PLAN 4
Creatinine 4.73 on admission. Suspect prerenal PINA 2/2 poor renal perfusion from DKA/HHS, as also markedly hypernatremic on admission (uncorrected 138) and rapid improvement with hypotonic fluid resuscitation.     - f/u daughter regarding baseline kidney function  - can likely f/u with [facility]CP or nephrology  - PINA gradually improving

## 2022-12-26 NOTE — OCCUPATIONAL THERAPY INITIAL EVALUATION ADULT - PERTINENT HX OF CURRENT PROBLEM, REHAB EVAL
83yo M with PMHx of HTN, HLD, AFib (not on AC), DM on insulin who presented with AMS, decreased PO intake, and increased thirst for several days. Found to be hyperglycemic with anion gap acidosis, admitted to MICU for DKA treatment with insulin gtt. Stepped down to RMF.

## 2022-12-26 NOTE — OCCUPATIONAL THERAPY INITIAL EVALUATION ADULT - LIVES WITH, PROFILE
Pt lives in apt with daughter. As per pt, pt's daughter assists with all ADLs and he uses a rolling walker for functional mobility. However as per chart, pt's daughter is not around 24/7./children

## 2022-12-26 NOTE — PROGRESS NOTE ADULT - PROBLEM SELECTOR PLAN 1
Reports taking lantus 30 or 40 units every evening with no mealtime coverage. A1c on admission 11.9 suggesting poor control. Pending discussion with daughter on compliance issue vs need for escalation.     - per MICU team, endocrinology consulted -- f/u recs  - f/u med rec with daughter  - c/w 20U lantus qHS  - c/w lispro 5U premeal  - c/w ISS QID  - diabetes education  - outpatient follow up at [facility]

## 2022-12-26 NOTE — PROGRESS NOTE ADULT - SUBJECTIVE AND OBJECTIVE BOX
INTERVAL EVENTS: WANDA    SUBJECTIVE / INTERVAL HPI: Patient seen and examined at bedside. Reports feeling well. Is only hungry and wants to eat breakfast. Affirms that he wants to go to rehab.    ROS: negative unless otherwise stated above.    VITAL SIGNS:  Vital Signs Last 24 Hrs  T(C): 36.7 (26 Dec 2022 06:18), Max: 36.8 (25 Dec 2022 21:45)  T(F): 98.1 (26 Dec 2022 06:18), Max: 98.2 (25 Dec 2022 21:45)  HR: 90 (26 Dec 2022 07:05) (90 - 110)  BP: 145/77 (26 Dec 2022 06:18) (115/70 - 145/77)  BP(mean): --  RR: 16 (26 Dec 2022 07:05) (16 - 18)  SpO2: 94% (26 Dec 2022 07:05) (94% - 98%)    Parameters below as of 26 Dec 2022 07:05  Patient On (Oxygen Delivery Method): room air            PHYSICAL EXAM:    General: NAD, sleeping upright in bed  HEENT: dry oral MM, poor dentition  Neck: supple  Cardiovascular: +S1/S2; irregular, no MRG  Respiratory: CTA B/L; no W/R/R, no accessory muscle use  Gastrointestinal: soft, NT/ND  Uro: condom catheter with yellow urine  Extremities: b/l SCD's; WWP; no edema, clubbing or cyanosis  Vascular: 2+ radial pulses B/L  Neurological: AAOx3; no focal deficits  Psych: calm, appropriate    MEDICATIONS:  MEDICATIONS  (STANDING):  bacitracin   Ointment 1 Application(s) Topical two times a day  glucagon  Injectable 1 milliGRAM(s) IntraMuscular once  insulin glargine Injectable (LANTUS) 20 Unit(s) SubCutaneous at bedtime  insulin lispro (ADMELOG) corrective regimen sliding scale   SubCutaneous Before meals and at bedtime  insulin lispro Injectable (ADMELOG) 5 Unit(s) SubCutaneous three times a day before meals  metoprolol succinate ER 25 milliGRAM(s) Oral every 24 hours  pantoprazole  Injectable 40 milliGRAM(s) IV Push every 12 hours  potassium chloride   Powder 40 milliEquivalent(s) Oral once  potassium phosphate / sodium phosphate Powder (PHOS-NaK) 1 Packet(s) Oral once    MEDICATIONS  (PRN):  acetaminophen     Tablet .. 650 milliGRAM(s) Oral every 6 hours PRN Temp greater or equal to 38C (100.4F), Mild Pain (1 - 3)  dextrose Oral Gel 15 Gram(s) Oral once PRN Blood Glucose LESS THAN 70 milliGRAM(s)/deciliter      ALLERGIES:  Allergies    Allergy Status Unknown    Intolerances        LABS:                        10.0   4.08  )-----------( 144      ( 26 Dec 2022 05:30 )             31.2     12-26    147<H>  |  116<H>  |  24<H>  ----------------------------<  66<L>  3.5   |  22  |  1.32<H>    Ca    8.4      26 Dec 2022 05:30  Phos  2.0     12-26  Mg     2.1     12-26          CAPILLARY BLOOD GLUCOSE      POCT Blood Glucose.: 65 mg/dL (26 Dec 2022 08:16)      RADIOLOGY & ADDITIONAL TESTS: Reviewed.

## 2022-12-26 NOTE — CONSULT NOTE ADULT - SUBJECTIVE AND OBJECTIVE BOX
HISTORY OF PRESENT ILLNESS  83 yo M, HTN, HLD, AFib off AC, DM2 on insulin,   pw encephalopathy , reportedly refusing to eat / take medications over the past few days     On admission glucose was 1124 with an anion gap of 32, Bicarb 10, lactate 9.1 BHB 3.6   Patient was admitted for DKA to ICU , given IVF and managed with Insulin drip.  DKA now resolved and course complicated by GI bleed with melena.   GI evaluated patient, will likely get EGD and colonoscopy outpatient  Patient remains stable and preparing for rehab placement  Endocrinology consulted for diabetes management and outpatient regimen adjustment prior to rehab.   Currently with no complaints.  Patient is poor historian. Defer questions to daughter.       DIABETES HISTORY  - Age at diagnosis:   - Symptoms at time of diagnosis:   - Current Therapy:  - History of other regimens:   - History of hypoglycemia:   - History of DKA/HHS:   - Complications:   - Home FSG:        > Fasting: *** mg/dL.        > Before meals: *** mg/dL.        > Bedtime: *** mg/dL.  - Diet:          > Breakfast:         > Lunch:        > Dinner:        > Snacks:  - Physical activity:    - Outpatient follow-up:     PAST MEDICAL & SURGICAL HISTORY  As per history of present illness.     FAMILY HISTORY  - Diabetes:  - Thyroid:  - Autoimmune:  - Other:    SOCIAL HISTORY  - Work:  - Alcohol:  - Smoking:  - Recreational Drugs:    ALLERGIES  Allergy Status Unknown    CURRENT MEDICATIONS  acetaminophen     Tablet .. 650 milliGRAM(s) Oral every 6 hours PRN  bacitracin   Ointment 1 Application(s) Topical two times a day  dextrose Oral Gel 15 Gram(s) Oral once PRN  glucagon  Injectable 1 milliGRAM(s) IntraMuscular once  insulin glargine Injectable (LANTUS) 15 Unit(s) SubCutaneous at bedtime  insulin lispro (ADMELOG) corrective regimen sliding scale   SubCutaneous Before meals and at bedtime  insulin lispro Injectable (ADMELOG) 5 Unit(s) SubCutaneous three times a day before meals  metoprolol succinate ER 25 milliGRAM(s) Oral every 24 hours  pantoprazole    Tablet 40 milliGRAM(s) Oral every 12 hours    REVIEW OF SYSTEMS  Constitutional:  Negative fever, chills or loss of appetite.  Eyes:  Negative blurry vision or double vision.  Cardiovascular:  Negative for chest pain or palpitations.  Respiratory:  Negative for cough, wheezing, or shortness of breath.   Gastrointestinal:  Negative for nausea, vomiting, diarrhea, constipation, or abdominal pain.  Genitourinary:  Negative frequency, urgency or dysuria.  Neurologic:  No headache, confusion, dizziness, lightheadedness.    PHYSICAL EXAM  Vital Signs Last 24 Hrs  T(C): 36.7 (26 Dec 2022 06:18), Max: 36.8 (25 Dec 2022 21:45)  T(F): 98.1 (26 Dec 2022 06:18), Max: 98.2 (25 Dec 2022 21:45)  HR: 93 (26 Dec 2022 09:00) (90 - 110)  BP: 103/61 (26 Dec 2022 09:00) (103/61 - 145/77)  BP(mean): --  RR: 18 (26 Dec 2022 09:00) (16 - 18)  SpO2: 98% (26 Dec 2022 09:00) (94% - 98%)    Parameters below as of 26 Dec 2022 09:00  Patient On (Oxygen Delivery Method): room air    Constitutional: Awake, alert, in no acute distress.   HEENT: Normocephalic, atraumatic, FRANCOISE, no proptosis or lid retraction.   Neck: supple, no acanthosis, no thyromegaly or palpable thyroid nodules.  Respiratory: Lungs clear to ausculation bilaterally.   Cardiovascular: regular rhythm, normal S1 and S2, no audible murmurs.   GI: soft, non-tender, non-distended, bowel sounds present, no masses appreciated.  Extremities: No lower extremity edema, peripheral pulses present.   Skin: no rashes.   Psychiatric: AAO x 2-3. Normal affect/mood.     LABS  CBC - WBC/HGB/HTC/PLT: 4.08/10.0/31.2/144 (12-26-22)  BMP: Na/K/Cl/Bicarb/BUN/Cr/Gluc: 147/3.5/116/22/24/1.32/66 (12-26-22)  Anion Gap: 9 (12-26-22)  eGFR: 54 (12-26-22)  Calcium: 8.4 (12-26-22)  Phosphorus: 2.0 (12-26-22)  Magnesium: 2.1 (12-26-22)  LFT - Alb/Tprot/Tbili/Dbili/AlkPhos/ALT/AST: 2.7/--/0.3/--/74/8/14 (12-24-22)  PT/aPTT/INR: 11.2/27.0/0.96 (12-22-22)      CAPILLARY BLOOD GLUCOSE & INSULIN RECEIVED  Yesterday  - Dinner FSG: *** mg/dL = *** units of premeal Lispro + *** units of Lispro sliding scale.   - Bedtime FSG: *** mg/dL = *** units of Lantus + *** units Lispro sliding scale.     Today  - Breakfast FSG: *** mg/dL = *** units of premeal Lispro + *** units of Lispro sliding scale.   - Lunch FSG: *** mg/dL = *** units of premeal Lispro + *** units of Lispro sliding scale.     138 mg/dL (12-26 @ 09:09)  65 mg/dL (12-26 @ 08:16)  241 mg/dL (12-25 @ 21:25)  308 mg/dL (12-25 @ 17:09)  299 mg/dL (12-25 @ 12:05)    ASSESSMENT / RECOMMENDATIONS  83yo M with PMHx of HTN, HLD, AFib (not on AC), DM on insulin who presented with AMS, decreased PO intake, and increased thirst for several days. Found to be hyperglycemic with anion gap acidosis, admitted to MICU for DKA treatment with insulin gtt. Stepped down to RMF. Endocrinology recs needed for outpatient insulin regimen. Pending MANNY.   A1C: 11.9 %  BUN: 24  Creatinine: 1.32  eGFR: 54Weight (kg): 70  BMI (kg/m2): 26.5  Ejection Fraction:     # Type 2 diabetes mellitus  - Please continue lantus *** units at bedtime.   - Continue lispro *** units before each meal.  - Continue lispro moderate / low dose sliding scale four times daily with meals and at bedtime.  - Patient's fingerstick glucose goal is 100-180 mg/dL.    - For discharge, patient can ***.    - Patient can follow up at discharge with [facility] by calling [phone] to make an appointment.      Case discussed with Dr. Tripp. Primary team updated.       Ninfa Betancourt   Endocrinology Fellow    Service Pager: [phone]  HISTORY OF PRESENT ILLNESS  83 yo M, HTN, HLD, AFib off AC, DM2 on insulin,   pw encephalopathy , reportedly refusing to eat / take medications over the past few days     On admission glucose was 1124 with an anion gap of 32, Bicarb 10, lactate 9.1 BHB 3.6   Patient was admitted for DKA to ICU , given IVF and managed with Insulin drip.  DKA now resolved and course complicated by GI bleed with melena.   GI evaluated patient, will likely get EGD and colonoscopy outpatient  Patient remains stable and preparing for rehab placement  Endocrinology consulted for diabetes management and outpatient regimen adjustment prior to rehab.   Currently with no complaints.  Patient is poor historian. Defer questions to daughter.       DIABETES HISTORY  - Age at diagnosis: long standing history of Diabetes, previously   - Symptoms at time of diagnosis:   - Current Therapy: Lantus 37units at bedtime,   - History of other regimens:   - History of hypoglycemia: unknown  - History of DKA/HHS: first time this admission   - Complications: neuropathy,    - Home FSs    - Physical activity:  sedentary  - Outpatient follow-up: PCP    PAST MEDICAL & SURGICAL HISTORY  As per history of present illness.     FAMILY HISTORY  - Diabetes: his mother   - Thyroid:  - Autoimmune:  - Other:    SOCIAL HISTORY  - Work: retired   - Alcohol: occasional   - Smoking: current smoker  - Recreational Drugs:    ALLERGIES  Allergy Status Unknown    CURRENT MEDICATIONS  acetaminophen     Tablet .. 650 milliGRAM(s) Oral every 6 hours PRN  bacitracin   Ointment 1 Application(s) Topical two times a day  dextrose Oral Gel 15 Gram(s) Oral once PRN  glucagon  Injectable 1 milliGRAM(s) IntraMuscular once  insulin glargine Injectable (LANTUS) 15 Unit(s) SubCutaneous at bedtime  insulin lispro (ADMELOG) corrective regimen sliding scale   SubCutaneous Before meals and at bedtime  insulin lispro Injectable (ADMELOG) 5 Unit(s) SubCutaneous three times a day before meals  metoprolol succinate ER 25 milliGRAM(s) Oral every 24 hours  pantoprazole    Tablet 40 milliGRAM(s) Oral every 12 hours    REVIEW OF SYSTEMS  Constitutional:  Negative fever, chills or loss of appetite.  Eyes:  Negative blurry vision or double vision.  Cardiovascular:  Negative for chest pain or palpitations.  Respiratory:  Negative for cough, wheezing, or shortness of breath.   Gastrointestinal:  Negative for nausea, vomiting, diarrhea, constipation, or abdominal pain.  Genitourinary:  Negative frequency, urgency or dysuria.  Neurologic:  No headache, confusion, dizziness, lightheadedness.    PHYSICAL EXAM  Vital Signs Last 24 Hrs  T(C): 36.7 (26 Dec 2022 06:18), Max: 36.8 (25 Dec 2022 21:45)  T(F): 98.1 (26 Dec 2022 06:18), Max: 98.2 (25 Dec 2022 21:45)  HR: 93 (26 Dec 2022 09:00) (90 - 110)  BP: 103/61 (26 Dec 2022 09:00) (103/61 - 145/77)  BP(mean): --  RR: 18 (26 Dec 2022 09:) (16 - 18)  SpO2: 98% (26 Dec 2022 09:) (94% - 98%)    Parameters below as of 26 Dec 2022 09:  Patient On (Oxygen Delivery Method): room air    Constitutional: Awake, alert, in no acute distress.   HEENT: Normocephalic, atraumatic, FRANCOISE, no proptosis or lid retraction.   Neck: supple, no acanthosis, no thyromegaly or palpable thyroid nodules.  Respiratory: Lungs clear to ausculation bilaterally.   Cardiovascular: regular rhythm, normal S1 and S2, no audible murmurs.   GI: soft, non-tender, non-distended, bowel sounds present, no masses appreciated.  Extremities: No lower extremity edema, peripheral pulses present.   Skin: no rashes.   Psychiatric: AAO x 2-3. Normal affect/mood.     LABS  CBC - WBC/HGB/HTC/PLT: 4.08/10.0/31.2/144 (22)  BMP: Na/K/Cl/Bicarb/BUN/Cr/Gluc: 147/3.5/116/22/24/1.32/66 (22)  Anion Gap: 9 (22)  eGFR: 54 (22)  Calcium: 8.4 (22)  Phosphorus: 2.0 (22)  Magnesium: 2.1 (22)  LFT - Alb/Tprot/Tbili/Dbili/AlkPhos/ALT/AST: 2.7/--/0.3/--/74/8/14 (22)  PT/aPTT/INR: 11.2/27.0/0.96 (22)      CAPILLARY BLOOD GLUCOSE & INSULIN RECEIVED    138 mg/dL ( @ 09:09)  65 mg/dL ( @ 08:16)  241 mg/dL ( @ 21:25)  308 mg/dL ( @ 17:09)  299 mg/dL ( @ 12:05)    ASSESSMENT / RECOMMENDATIONS  81yo M with PMHx of HTN, HLD, AFib (not on AC), DM on insulin who presented with AMS, decreased PO intake, and increased thirst for several days. Found to be hyperglycemic with anion gap acidosis, admitted to MICU for DKA treatment with insulin gtt. Stepped down to RMF. Endocrinology recs needed for outpatient insulin regimen. Pending MANNY.   A1C: 11.9 %  BUN: 24  Creatinine: 1.32  eGFR: 54Weight (kg): 70  BMI (kg/m2): 26.5  Ejection Fraction:     # Type 2 diabetes mellitus  -insulinopenic type 2 DM on i    Case discussed with Dr. Tripp. Primary team updated.       Ninfa Betancourt   Endocrinology Fellow    Service Pager: [phone]  HISTORY OF PRESENT ILLNESS  81 yo M, HTN, HLD, AFib off AC, DM2 on insulin,   pw encephalopathy , reportedly refusing to eat / take medications over the past few days     On admission glucose was 1124 with an anion gap of 32, Bicarb 10, lactate 9.1 BHB 3.6   Patient was admitted for DKA to ICU , given IVF and managed with Insulin drip.  DKA now resolved and course complicated by GI bleed with melena.   GI evaluated patient, will likely get EGD and colonoscopy outpatient  Patient remains stable and preparing for rehab placement  Endocrinology consulted for diabetes management and outpatient regimen adjustment prior to rehab.   Currently with no complaints.  Patient is poor historian. Defer questions to daughter.       DIABETES HISTORY  - Age at diagnosis: long standing history of Diabetes, previously   - Symptoms at time of diagnosis:   - Current Therapy: Lantus 37units at bedtime,   - History of other regimens:   - History of hypoglycemia: unknown  - History of DKA/HHS: first time this admission   - Complications: neuropathy,    - Home FSs    - Physical activity:  sedentary  - Outpatient follow-up: PCP    PAST MEDICAL & SURGICAL HISTORY  As per history of present illness.     FAMILY HISTORY  - Diabetes: his mother     SOCIAL HISTORY  - Work: retired   - Alcohol: occasional   - Smoking: current smoker  - Recreational Drugs:    ALLERGIES  Allergy Status Unknown    CURRENT MEDICATIONS  acetaminophen     Tablet .. 650 milliGRAM(s) Oral every 6 hours PRN  bacitracin   Ointment 1 Application(s) Topical two times a day  dextrose Oral Gel 15 Gram(s) Oral once PRN  glucagon  Injectable 1 milliGRAM(s) IntraMuscular once  insulin glargine Injectable (LANTUS) 15 Unit(s) SubCutaneous at bedtime  insulin lispro (ADMELOG) corrective regimen sliding scale   SubCutaneous Before meals and at bedtime  insulin lispro Injectable (ADMELOG) 5 Unit(s) SubCutaneous three times a day before meals  metoprolol succinate ER 25 milliGRAM(s) Oral every 24 hours  pantoprazole    Tablet 40 milliGRAM(s) Oral every 12 hours    REVIEW OF SYSTEMS  Constitutional:  Negative fever, chills or loss of appetite.  Eyes:  Negative blurry vision or double vision.  Cardiovascular:  Negative for chest pain or palpitations.  Respiratory:  Negative for cough, wheezing, or shortness of breath.   Gastrointestinal:  Negative for nausea, vomiting, diarrhea, constipation, or abdominal pain.  Genitourinary:  Negative frequency, urgency or dysuria.  Neurologic:  No headache, confusion, dizziness, lightheadedness.    PHYSICAL EXAM  Vital Signs Last 24 Hrs  T(C): 36.7 (26 Dec 2022 06:18), Max: 36.8 (25 Dec 2022 21:45)  T(F): 98.1 (26 Dec 2022 06:18), Max: 98.2 (25 Dec 2022 21:45)  HR: 93 (26 Dec 2022 09:00) (90 - 110)  BP: 103/61 (26 Dec 2022 09:00) (103/61 - 145/77)  BP(mean): --  RR: 18 (26 Dec 2022 09:00) (16 - 18)  SpO2: 98% (26 Dec 2022 09:) (94% - 98%)    Parameters below as of 26 Dec 2022 09:00  Patient On (Oxygen Delivery Method): room air    Constitutional: Awake, alert, in no acute distress.   HEENT: Normocephalic, atraumatic, FRANCOISE, no proptosis or lid retraction.   Neck: supple, no acanthosis, no thyromegaly or palpable thyroid nodules.  Respiratory: Lungs clear to ausculation bilaterally.   Cardiovascular: regular rhythm, normal S1 and S2, no audible murmurs.   GI: soft, non-tender, non-distended, bowel sounds present, no masses appreciated.  Extremities: No lower extremity edema, peripheral pulses present.   Skin: no rashes.   Psychiatric: AAO x 2-3. Normal affect/mood.     LABS  CBC - WBC/HGB/HTC/PLT: 4.08/10.0/31.2/144 (22)  BMP: Na/K/Cl/Bicarb/BUN/Cr/Gluc: 147/3.5/116/22/24/1.32/66 (22)  Anion Gap: 9 (22)  eGFR: 54 (22)  Calcium: 8.4 (22)  Phosphorus: 2.0 (22)  Magnesium: 2.1 (22)  LFT - Alb/Tprot/Tbili/Dbili/AlkPhos/ALT/AST: 2.7/--/0.3/--/74/8/14 (22)  PT/aPTT/INR: 11.2/27.0/0.96 (22)      CAPILLARY BLOOD GLUCOSE & INSULIN RECEIVED    138 mg/dL ( @ 09:09)  65 mg/dL ( @ 08:16)  241 mg/dL ( @ 21:25)  308 mg/dL ( @ 17:09)  299 mg/dL ( @ 12:05)    ASSESSMENT / RECOMMENDATIONS  83yo M with PMHx of HTN, HLD, AFib (not on AC), DM on insulin who presented with AMS, decreased PO intake, and increased thirst for several days. Found to be hyperglycemic with anion gap acidosis, admitted to MICU for DKA treatment with insulin gtt. Stepped down to RMF. Endocrinology recs needed for outpatient insulin regimen. Pending MANNY.   A1C: 11.9 %  BUN: 24  Creatinine: 1.32  eGFR: 54Weight (kg): 70  BMI (kg/m2): 26.5  Ejection Fraction:     # Type 2 diabetes mellitus  -Type 2 DM requiring insulin  likely was non-compliant and missed insulin doses leading to DKA  per daughter patient manages own insulin, likely will need help managing moving forward  Inpatient recommendation:  May continue 15 units Lantus at bedtime and start 5units lispro pre-meals  moderate dose sliding scale before meals and at bedtime    Outpatient: recommending mixed insulin for better compliance and prandial control  70/30 insulin 10units before breakfast and 10units before dinner     Case discussed with Dr. Tripp. Primary team updated.       Ninfa Betancourt   Endocrinology Fellow    Service Pager: [phone]

## 2022-12-26 NOTE — PROGRESS NOTE ADULT - PROBLEM SELECTOR PLAN 5
Likely subacute-chronic issue as patient reports that he was due for colonoscopy despite being age 82.   Iron studies indicate CECE.    - GI consulted by MICU and recommended outpatient EGD/colo  - c/w PPI bid. change to PO

## 2022-12-26 NOTE — CONSULT NOTE ADULT - ATTENDING COMMENTS
Attending endocrinologist.    The patient was seen with Dr. Betancourt.  He is a 82 y.o. with a h/o type 2 DM managed on insulin tx at home.  He was admitted on 12/22/22 because of a changing mental status and was found to be in hyperosmolar state/DKA:  glucose was 1124 mg/dl, CO2 was 10. elevated levels of beta-hydroxybutyrate present.  He also had GI bleeding.  Much improved now, is being prepared for a discharge to a rehab. facility.  The latest glucose today is 138 mg/dl.  Would c/d on 70/30 Novolog, 10 u BID; 3 meals and a bedtime snack.  Agree with Dr. Betancourt's assessment and plan. Attending endocrinologist.    The patient was seen with Dr. Betancourt.  He is a 82 y.o. with a h/o type 2 DM managed on insulin tx at home.  He was admitted on 12/22/22 because of a changing mental status and was found to be in hyperosmolar state/DKA:  glucose was 1124 mg/dl, CO2 was 10. elevated levels of beta-hydroxybutyrate present.  He also had GI bleeding.  Much improved now, is being prepared for a discharge to a rehab. facility.  The latest glucose today is 138 mg/dl.  Would d/c on 70/30 Novolog, 10 u BID; 3 meals and a bedtime snack.  Agree with Dr. Betancourt's assessment and plan.

## 2022-12-26 NOTE — PROGRESS NOTE ADULT - ASSESSMENT
83yo M with PMHx of HTN, HLD, AFib (not on AC), DM on insulin who presented with AMS, decreased PO intake, and increased thirst for several days. Found to be hyperglycemic with anion gap acidosis, admitted to MICU for DKA treatment with insulin gtt. Stepped down to RMF. Follow up Endocrinology recs for outpatient insulin regimen. Pending MANNY.

## 2022-12-26 NOTE — PROGRESS NOTE ADULT - PROBLEM SELECTOR PLAN 2
History of a fib on no rate control with intermittent sinus tachycardia 2/2 severe dehydration.   Normal sinus on transfer from MICU to [facility].   Daughter states he was on a blood thinner but unclear if this was DOAC vs antiplatelet agent.   [facility]    - med rec to confirm which AC med pt was on  - can restart AC inpatient once med rec completed or will hold if found to have melena again  - c/w toprol 25 qd

## 2022-12-26 NOTE — PROGRESS NOTE ADULT - PROBLEM SELECTOR PLAN 7
F: none  E: replenish as appropriate  N: Pureed  DVT prophylaxis: SCDs  dispo: MANNY  Code Status: full

## 2022-12-26 NOTE — PROGRESS NOTE ADULT - ATTENDING COMMENTS
GI reccs: reinitiation of AC  GOC discussion with daughter regarding continuation of AC  labs reviewed, stable  plan for outpatient colonoscopy and EGD.  AM FS 65, endocrine to follow re: adjustments to basal bolus    Plan for MANNY, medically cleared at this time for dc

## 2022-12-27 LAB
GLUCOSE BLDC GLUCOMTR-MCNC: 126 MG/DL — HIGH (ref 70–99)
GLUCOSE BLDC GLUCOMTR-MCNC: 177 MG/DL — HIGH (ref 70–99)
GLUCOSE BLDC GLUCOMTR-MCNC: 190 MG/DL — HIGH (ref 70–99)
GLUCOSE BLDC GLUCOMTR-MCNC: 217 MG/DL — HIGH (ref 70–99)
SARS-COV-2 RNA SPEC QL NAA+PROBE: SIGNIFICANT CHANGE UP

## 2022-12-27 PROCEDURE — 99232 SBSQ HOSP IP/OBS MODERATE 35: CPT | Mod: GC

## 2022-12-27 RX ORDER — SENNA PLUS 8.6 MG/1
2 TABLET ORAL AT BEDTIME
Refills: 0 | Status: DISCONTINUED | OUTPATIENT
Start: 2022-12-27 | End: 2022-12-28

## 2022-12-27 RX ORDER — POLYETHYLENE GLYCOL 3350 17 G/17G
17 POWDER, FOR SOLUTION ORAL EVERY 24 HOURS
Refills: 0 | Status: DISCONTINUED | OUTPATIENT
Start: 2022-12-27 | End: 2022-12-28

## 2022-12-27 RX ADMIN — CARVEDILOL PHOSPHATE 6.25 MILLIGRAM(S): 80 CAPSULE, EXTENDED RELEASE ORAL at 21:48

## 2022-12-27 RX ADMIN — Medication 500 MILLIGRAM(S): at 09:08

## 2022-12-27 RX ADMIN — Medication 2: at 22:23

## 2022-12-27 RX ADMIN — Medication 2: at 12:46

## 2022-12-27 RX ADMIN — Medication 1 APPLICATION(S): at 06:15

## 2022-12-27 RX ADMIN — CARVEDILOL PHOSPHATE 6.25 MILLIGRAM(S): 80 CAPSULE, EXTENDED RELEASE ORAL at 09:08

## 2022-12-27 RX ADMIN — Medication 5 UNIT(S): at 12:46

## 2022-12-27 RX ADMIN — Medication 5 UNIT(S): at 09:07

## 2022-12-27 RX ADMIN — Medication 5 UNIT(S): at 17:28

## 2022-12-27 RX ADMIN — PANTOPRAZOLE SODIUM 40 MILLIGRAM(S): 20 TABLET, DELAYED RELEASE ORAL at 06:15

## 2022-12-27 RX ADMIN — TAMSULOSIN HYDROCHLORIDE 0.4 MILLIGRAM(S): 0.4 CAPSULE ORAL at 22:43

## 2022-12-27 RX ADMIN — PANTOPRAZOLE SODIUM 40 MILLIGRAM(S): 20 TABLET, DELAYED RELEASE ORAL at 17:29

## 2022-12-27 RX ADMIN — POLYETHYLENE GLYCOL 3350 17 GRAM(S): 17 POWDER, FOR SOLUTION ORAL at 11:17

## 2022-12-27 RX ADMIN — Medication 1 APPLICATION(S): at 17:29

## 2022-12-27 RX ADMIN — Medication 4: at 17:28

## 2022-12-27 RX ADMIN — INSULIN GLARGINE 15 UNIT(S): 100 INJECTION, SOLUTION SUBCUTANEOUS at 22:23

## 2022-12-27 RX ADMIN — SENNA PLUS 2 TABLET(S): 8.6 TABLET ORAL at 22:44

## 2022-12-27 RX ADMIN — Medication 1 MILLIGRAM(S): at 09:08

## 2022-12-27 RX ADMIN — ATORVASTATIN CALCIUM 40 MILLIGRAM(S): 80 TABLET, FILM COATED ORAL at 22:43

## 2022-12-27 NOTE — PROGRESS NOTE ADULT - ASSESSMENT
83yo M with PMHx of HTN, HLD, AFib (not on AC), DM on insulin who presented with AMS, decreased PO intake, and increased thirst for several days. Found to be hyperglycemic with anion gap acidosis, admitted to MICU for DKA treatment with insulin gtt. Stepped down to RMF. Follow up Endocrinology recs for outpatient insulin regimen. Pending MANNY, f/u SW as pt likely has insurance through Robert Applebaum MD.

## 2022-12-27 NOTE — DISCHARGE NOTE PROVIDER - NSDCFUADDAPPT_GEN_ALL_CORE_FT
Please call your doctor's office to schedule an appointment within 1 week to monitor your sugar levels and blood pressure.    Please schedule an appointment with your Gastroenterologist.

## 2022-12-27 NOTE — DISCHARGE NOTE PROVIDER - PROVIDER TOKENS
FREE:[LAST:[Escobar],FIRST:[Randy],PHONE:[phone]],FAX:[(   )    -],ADDRESS:[[address] [place] [address]],FOLLOWUP:[1 week],ESTABLISHEDPATIENT:[T]]

## 2022-12-27 NOTE — PROGRESS NOTE ADULT - PROBLEM SELECTOR PLAN 5
Likely subacute-chronic issue as patient reports that he was due for colonoscopy despite being age 82.   Iron studies indicate CECE.    - GI consulted by MICU and recommended outpatient EGD/colo  - c/w PPI bid PO  - discussion with pt regarding restarting home aspirin Likely subacute-chronic issue as patient reports that he was due for colonoscopy despite being age 82.   Iron studies indicate CECE.    - GI consulted by MICU and recommended outpatient EGD/colo  - c/w PPI bid PO, pt is set up with GI outpatient  - hold home aspirin as pt had melena when in micu; also no indication for aspirin in this patient

## 2022-12-27 NOTE — DISCHARGE NOTE PROVIDER - CARE PROVIDER_API CALL
Randy Escobar  [facility]. [address] [place] [id number]  Phone: [phone]  Fax: (   )    -  Established Patient  Follow Up Time: 1 week

## 2022-12-27 NOTE — DISCHARGE NOTE PROVIDER - NSDCCPCAREPLAN_GEN_ALL_CORE_FT
PRINCIPAL DISCHARGE DIAGNOSIS  Diagnosis: DKA (diabetic ketoacidosis)  Assessment and Plan of Treatment: When you came to the hospital you were found to have very high sugars. You had to be treated in the intensive care unit. You were seen by the Endocrinology team.  - please continue taking long acting insulin at night  - please start taking a new medication which can be injected once per week  - please follow up with you primary care doctor at the [facility] office      SECONDARY DISCHARGE DIAGNOSES  Diagnosis: HTN (hypertension)  Assessment and Plan of Treatment: Please follow up with your doctor to monitor your blood pressure. Do not take aldactone until your doctor tells you.    Diagnosis: Melena  Assessment and Plan of Treatment: You had one bowel movement that was suggestive of bleeding from the stomach. Your bowel movements after that were normal. You were seen by the Gastroentereology team who recommended that you have follow up soon with your Gastroenterologist at the [facility].  - please take protonix twice per day until you see your gastroenterologist.  - please schedule an appointment with your Gastroenterologist     PRINCIPAL DISCHARGE DIAGNOSIS  Diagnosis: DKA (diabetic ketoacidosis)  Assessment and Plan of Treatment: When you came to the hospital you were found to have very high sugars. You had to be treated in the intensive care unit. You were seen by the Endocrinology team.  - please start your new insulin regimen of mixed insulin (70/30) 12 units twice daily (before breakfast and before dinner)   - please follow up with you primary care doctor at the [facility] office      SECONDARY DISCHARGE DIAGNOSES  Diagnosis: HTN (hypertension)  Assessment and Plan of Treatment: Please follow up with your doctor to monitor your blood pressure. Do not take aldactone until your doctor tells you.    Diagnosis: Melena  Assessment and Plan of Treatment: You had one bowel movement that was suggestive of bleeding from the stomach. Your bowel movements after that were normal. You were seen by the Gastroentereology team who recommended that you have follow up soon with your Gastroenterologist at the [facility].  - please take protonix twice per day until you see your gastroenterologist.  - please schedule an appointment with your Gastroenterologist     PRINCIPAL DISCHARGE DIAGNOSIS  Diagnosis: DKA (diabetic ketoacidosis)  Assessment and Plan of Treatment: When you came to the hospital you were found to have very high sugars. You had to be treated in the intensive care unit. You were seen by the Endocrinology team.  - please start your new insulin regimen of NovoLog Mix 70/30 12 units twice daily (before breakfast and before dinner)   - please follow up with you primary care doctor at the [facility] office      SECONDARY DISCHARGE DIAGNOSES  Diagnosis: HTN (hypertension)  Assessment and Plan of Treatment: Please follow up with your doctor to monitor your blood pressure. Restart your aldactone upon discharge from hospital.    Diagnosis: Melena  Assessment and Plan of Treatment: You had one bowel movement that was suggestive of bleeding from the stomach. Your bowel movements after that were normal. You were seen by the Gastroentereology team who recommended that you have follow up soon with your Gastroenterologist at the [facility].  - please take protonix twice per day until you see your gastroenterologist.  - please schedule an appointment with your Gastroenterologist

## 2022-12-27 NOTE — PROGRESS NOTE ADULT - PROBLEM SELECTOR PLAN 3
Daughter knows he was on a diuretic as he was hospitalized in November with fluid in his legs and his lungs, but doesn't remember other details.  TTE this admission: Moderately reduced LV systolic function, Mildly reduced RV systolic function, Mild tricuspid regurgitation, No evidence of pulmonary hypertension, pulmonary artery systolic   pressure is 24 mmHg, Small pericardial effusion.    - med rec with daughter  - c/w home coreg 6.25 BID  - f/u cardiology outpatient with [facility], they can consider starting SGLT2 inh  - euvolemic  - restart home aldactone 12.5 qd Daughter knows he was on a diuretic as he was hospitalized in November with fluid in his legs and his lungs, but doesn't remember other details.  TTE this admission: Moderately reduced LV systolic function, Mildly reduced RV systolic function, Mild tricuspid regurgitation, No evidence of pulmonary hypertension, pulmonary artery systolic   pressure is 24 mmHg, Small pericardial effusion.    - c/w home coreg 6.25 BID  - f/u cardiology outpatient with [facility], they can consider starting SGLT2 inh  - euvolemic  - restart home aldactone 12.5 qd outpatient as BP is normotensive/soft here

## 2022-12-27 NOTE — DISCHARGE NOTE PROVIDER - NSDCMRMEDTOKEN_GEN_ALL_CORE_FT
Aldactone 25 mg oral tablet: 0.5 tab(s) orally once a day  ascorbic acid 500 mg oral capsule: 1 cap(s) orally once a day  aspirin 81 mg oral tablet: 1 tab(s) orally once a day  Coreg 6.25 mg oral tablet: 1 tab(s) orally 2 times a day  Flomax 0.4 mg oral capsule: 1 cap(s) orally once a day  folic acid 1 mg oral tablet: 1 tab(s) orally once a day  Lipitor 40 mg oral tablet: 1 tab(s) orally once a day  Vitamin B-100 oral tablet: 1 tab(s) orally once a day   Aldactone 25 mg oral tablet: 0.5 tab(s) orally once a day  ascorbic acid 500 mg oral capsule: 1 cap(s) orally once a day  Coreg 6.25 mg oral tablet: 1 tab(s) orally 2 times a day  Flomax 0.4 mg oral capsule: 1 cap(s) orally once a day  folic acid 1 mg oral tablet: 1 tab(s) orally once a day  Lipitor 40 mg oral tablet: 1 tab(s) orally once a day  pantoprazole 40 mg oral delayed release tablet: 1 tab(s) orally every 12 hours  Vitamin B-100 oral tablet: 1 tab(s) orally once a day   Aldactone 25 mg oral tablet: 0.5 tab(s) orally once a day  ascorbic acid 500 mg oral capsule: 1 cap(s) orally once a day  Coreg 6.25 mg oral tablet: 1 tab(s) orally 2 times a day  Flomax 0.4 mg oral capsule: 1 cap(s) orally once a day  folic acid 1 mg oral tablet: 1 tab(s) orally once a day  Lasix 20 mg oral tablet: 1 tab(s) orally every other day, As Needed for lower extremity edema   Lipitor 40 mg oral tablet: 1 tab(s) orally once a day  NovoLOG Mix 70/30 subcutaneous suspension: 12 unit(s) subcutaneous 2 times a day (before breakfast and before dinner)  pantoprazole 40 mg oral delayed release tablet: 1 tab(s) orally every 12 hours  Vitamin B-100 oral tablet: 1 tab(s) orally once a day

## 2022-12-27 NOTE — PROGRESS NOTE ADULT - SUBJECTIVE AND OBJECTIVE BOX
SUBJECTIVE / INTERVAL HPI: Patient was seen and examined this morning.     CAPILLARY BLOOD GLUCOSE & INSULIN RECEIVED  66 mg/dL (12-26 @ 05:30)  65 mg/dL (12-26 @ 08:16)  138 mg/dL (12-26 @ 09:09)  201 mg/dL (12-26 @ 12:01)  212 mg/dL (12-26 @ 17:02)  190 mg/dL (12-26 @ 21:15)  126 mg/dL (12-27 @ 08:30)  177 mg/dL (12-27 @ 12:31)      REVIEW OF SYSTEMS  Constitutional:  Negative fever, chills or loss of appetite.  Eyes:  Negative blurry vision or double vision.  Cardiovascular:  Negative for chest pain or palpitations.  Respiratory:  Negative for cough, wheezing, or shortness of breath.    Gastrointestinal:  Negative for nausea, vomiting, diarrhea, constipation, or abdominal pain.  Genitourinary:  Negative frequency, urgency or dysuria.  Neurologic:  No headache, confusion, dizziness, lightheadedness.    PHYSICAL EXAM  Vital Signs Last 24 Hrs  T(C): 36.4 (27 Dec 2022 12:00), Max: 36.9 (26 Dec 2022 20:35)  T(F): 97.5 (27 Dec 2022 12:00), Max: 98.4 (26 Dec 2022 20:35)  HR: 85 (27 Dec 2022 12:00) (75 - 92)  BP: 115/66 (27 Dec 2022 12:00) (105/68 - 116/64)  BP(mean): --  RR: 18 (27 Dec 2022 12:00) (18 - 20)  SpO2: 99% (27 Dec 2022 12:00) (97% - 99%)    Parameters below as of 27 Dec 2022 12:00  Patient On (Oxygen Delivery Method): room air        Constitutional: Awake, alert, in no acute distress.   HEENT: Normocephalic, atraumatic, FRANCOISE.  Respiratory: Lungs clear to ausculation bilaterally.   Cardiovascular: regular rhythm, normal S1 and S2, no audible murmurs.   GI: soft, non-tender, non-distended, bowel sounds present.  Extremities: No lower extremity edema.  Psychiatric: AAO x 3. Normal affect/mood.     LABS  CBC - WBC/HGB/HTC/PLT: 4.08/10.0/31.2/144 (12-26-22)  BMP - Na/K/Cl/Bicarb/BUN/Cr/Gluc/AG/eGFR: 147/3.5/116/22/24/1.32/66/9/54 (12-26-22)  Ca - 8.4 (12-26-22)  Phos - 2.0 (12-26-22)  Mg - 2.1 (12-26-22)  LFT - Alb/Tprot/Tbili/Dbili/AlkPhos/ALT/AST: 2.7/--/0.3/--/74/8/14 (12-24-22)  PT/aPTT/INR: 11.2/27.0/0.96 (12-22-22)       MEDICATIONS  MEDICATIONS  (STANDING):  ascorbic acid 500 milliGRAM(s) Oral daily  atorvastatin 40 milliGRAM(s) Oral at bedtime  bacitracin   Ointment 1 Application(s) Topical two times a day  carvedilol 6.25 milliGRAM(s) Oral every 12 hours  folic acid 1 milliGRAM(s) Oral daily  glucagon  Injectable 1 milliGRAM(s) IntraMuscular once  insulin glargine Injectable (LANTUS) 15 Unit(s) SubCutaneous at bedtime  insulin lispro (ADMELOG) corrective regimen sliding scale   SubCutaneous Before meals and at bedtime  insulin lispro Injectable (ADMELOG) 5 Unit(s) SubCutaneous three times a day before meals  pantoprazole    Tablet 40 milliGRAM(s) Oral every 12 hours  polyethylene glycol 3350 17 Gram(s) Oral every 24 hours  senna 2 Tablet(s) Oral at bedtime  tamsulosin 0.4 milliGRAM(s) Oral at bedtime    MEDICATIONS  (PRN):  acetaminophen     Tablet .. 650 milliGRAM(s) Oral every 6 hours PRN Temp greater or equal to 38C (100.4F), Mild Pain (1 - 3)  dextrose Oral Gel 15 Gram(s) Oral once PRN Blood Glucose LESS THAN 70 milliGRAM(s)/deciliter    ASSESSMENT / RECOMMENDATIONS  Mr. Robles is a 82-year-old male with a past medical history of type 2 diabetes mellitus, hypertension, hyperlipidemia and atrial fibrillation who presented with altered mental status, decreased oral intake, and increased thirst for several days. He was found to be hyperglycemic with elevated anion gap acidosis, admitted to MICU for DKA management. He was later stepped down after resolution of DKA. He's planned for discharge to [facility]. Endocrinology following for recommendations regarding diabetes management.     A1C: 11.9 %  BUN: 24  Creatinine: 1.32  GFR: 54  Weight: 70  BMI: 26.5  EF:     # Type 2 diabetes mellitus  - Please continue lantus *** units at bedtime.   - Continue lispro *** units before each meal.  - Continue lispro moderate / low dose sliding scale before meals and at bedtime.  - Patient's fingerstick glucose goal is 100-180 mg/dL.    - For discharge, patient can ***.    - Patient can follow up at discharge with [facility] by calling [phone] to make an appointment.      Case discussed with Dr. Bush. Primary team updated.       Jayme Boudreaux    Endocrinology Fellow    Service Pager: [phone]    SUBJECTIVE / INTERVAL HPI: Patient was seen and examined this morning. He denied having any concerns or complaints.     CAPILLARY BLOOD GLUCOSE & INSULIN RECEIVED  201 mg/dL (12-26 @ 12:01) -> 5 units of lispro + 4 units of sliding scale.    212 mg/dL (12-26 @ 17:02) -> 5 units of lispro + 4 units of sliding scale.    190 mg/dL (12-26 @ 21:15) -> 15 units of lantus + 2 units of sliding scale.    126 mg/dL (12-27 @ 08:30) -> 5 units of lispro.    177 mg/dL (12-27 @ 12:31) -> 5 units of lispro + 2 units of sliding scale.     REVIEW OF SYSTEMS  Constitutional:  Negative fever, chills or loss of appetite.  Cardiovascular:  Negative for chest pain or palpitations.  Respiratory:  Negative for cough, wheezing, or shortness of breath.    Gastrointestinal:  Negative for nausea, vomiting, diarrhea, constipation, or abdominal pain.  Neurologic:  No headache, confusion, dizziness, lightheadedness.    PHYSICAL EXAM  Vital Signs Last 24 Hrs  T(C): 36.4 (27 Dec 2022 12:00), Max: 36.9 (26 Dec 2022 20:35)  T(F): 97.5 (27 Dec 2022 12:00), Max: 98.4 (26 Dec 2022 20:35)  HR: 85 (27 Dec 2022 12:00) (75 - 92)  BP: 115/66 (27 Dec 2022 12:00) (105/68 - 116/64)  BP(mean): --  RR: 18 (27 Dec 2022 12:00) (18 - 20)  SpO2: 99% (27 Dec 2022 12:00) (97% - 99%)    Parameters below as of 27 Dec 2022 12:00  Patient On (Oxygen Delivery Method): room air    Constitutional: Awake, alert, in no acute distress.   HEENT: Normocephalic, atraumatic, FRANCOISE.  Respiratory: Lungs clear to ausculation bilaterally.   Cardiovascular: regular rhythm, normal S1 and S2, no audible murmurs.   GI: soft, non-tender, non-distended, bowel sounds present.  Extremities: No lower extremity edema.  Psychiatric: AAO x 2-3. Normal affect/mood.     LABS  CBC - WBC/HGB/HTC/PLT: 4.08/10.0/31.2/144 (12-26-22)  BMP - Na/K/Cl/Bicarb/BUN/Cr/Gluc/AG/eGFR: 147/3.5/116/22/24/1.32/66/9/54 (12-26-22)  Ca - 8.4 (12-26-22)  Phos - 2.0 (12-26-22)  Mg - 2.1 (12-26-22)  LFT - Alb/Tprot/Tbili/Dbili/AlkPhos/ALT/AST: 2.7/--/0.3/--/74/8/14 (12-24-22)  PT/aPTT/INR: 11.2/27.0/0.96 (12-22-22)     MEDICATIONS  MEDICATIONS  (STANDING):  ascorbic acid 500 milliGRAM(s) Oral daily  atorvastatin 40 milliGRAM(s) Oral at bedtime  bacitracin   Ointment 1 Application(s) Topical two times a day  carvedilol 6.25 milliGRAM(s) Oral every 12 hours  folic acid 1 milliGRAM(s) Oral daily  glucagon  Injectable 1 milliGRAM(s) IntraMuscular once  insulin glargine Injectable (LANTUS) 15 Unit(s) SubCutaneous at bedtime  insulin lispro (ADMELOG) corrective regimen sliding scale   SubCutaneous Before meals and at bedtime  insulin lispro Injectable (ADMELOG) 5 Unit(s) SubCutaneous three times a day before meals  pantoprazole    Tablet 40 milliGRAM(s) Oral every 12 hours  polyethylene glycol 3350 17 Gram(s) Oral every 24 hours  senna 2 Tablet(s) Oral at bedtime  tamsulosin 0.4 milliGRAM(s) Oral at bedtime    MEDICATIONS  (PRN):  acetaminophen     Tablet .. 650 milliGRAM(s) Oral every 6 hours PRN Temp greater or equal to 38C (100.4F), Mild Pain (1 - 3)  dextrose Oral Gel 15 Gram(s) Oral once PRN Blood Glucose LESS THAN 70 milliGRAM(s)/deciliter    ASSESSMENT / RECOMMENDATIONS  Mr. Robles is a 82-year-old male with a past medical history of type 2 diabetes mellitus, hypertension, hyperlipidemia and atrial fibrillation who presented with altered mental status, decreased oral intake, and increased thirst for several days. He was found to be hyperglycemic with elevated anion gap acidosis, admitted to MICU for DKA management. He was later stepped down after resolution of DKA. He's planned for discharge to [facility] Endocrinology following for recommendations regarding diabetes management.     A1C: 11.9 %  BUN: 24  Creatinine: 1.32  GFR: 54  Weight: 70  BMI: 26.5    # Type 2 diabetes mellitus  - Please continue lantus 15 units at bedtime.   - Continue lispro 5 units before each meal.  - Continue lispro moderate dose sliding scale before meals and at bedtime.  - Patient's fingerstick glucose goal is 100-180 mg/dL.    - For discharge, patient can continue with insulin 70/30, 12 units twice daily (before breakfast and before dinner).     Case discussed with Dr. Bush. Primary team updated.       Jayme Boudreaux    Endocrinology Fellow    Service Pager: [phone]

## 2022-12-27 NOTE — DISCHARGE NOTE PROVIDER - NSDCCPTREATMENT_GEN_ALL_CORE_FT
PRINCIPAL PROCEDURE  Procedure: Transthoracic echocardiography (TTE)  Findings and Treatment: 1. Moderately reduced left ventricular systolic function.   2. Mildly reduced right ventricular systolic function.   3. Biatrial enlargement.   4. Mild tricuspid regurgitation.   5. No evidence of pulmonary hypertension, pulmonary artery systolic   pressure is 24 mmHg.   6. Small pericardial effusion predominantly adjacent to right atrium   without echocardiographic evidence of cardiac tamponade.   7. No prior echo is available for comparison.

## 2022-12-27 NOTE — PROGRESS NOTE ADULT - PROBLEM SELECTOR PLAN 7
F: none  E: replenish as appropriate  N: Pureed  DVT prophylaxis: SCDs  dispo: MANNY  Code Status: full F: none  E: replenish as appropriate  N: Pureed  DVT prophylaxis: SCDs  dispo: MANNY vs home if has improved functional status  Code Status: full

## 2022-12-27 NOTE — PROGRESS NOTE ADULT - PROBLEM SELECTOR PLAN 2
History of a fib on no rate control with intermittent sinus tachycardia 2/2 severe dehydration.   Normal sinus on transfer from MICU to [facility].   Med rec completed with daughter, pt was only on aspirin (primary prevention?).  CHADSVASC 5    - f/u outpatient with PCP regarding AC  - c/w home coreg 6.25 BID

## 2022-12-27 NOTE — DISCHARGE NOTE PROVIDER - ATTENDING DISCHARGE PHYSICAL EXAMINATION:
I personally examined Mr. Robles and discussed management with medical team.  Patient reports feeling at baseline, denies complaints  General: AOX3, NAD, lying in bed, speaking in full sentences, no labored breathing on RA  HEENT: AT/NC, no facial asymmetry  Lungs: good air entry, prolonged expiratory phase, + rhonchi, no crackles, no wheezes  Heart: RRR  Abdomen: soft, non-tender, + BS  Extremities: warm, no edema, no tenderness to palpation, no calf tenderness, moving all 4  Continue on insulin per endo  Hb stable, will need endoscopy as outpatient  Rhonchi on exam, patient denies SOB, no fevers or chills. Denies URI symptoms, reports chronic dry cough. No leucocytosis. Monitor respiratory status  MANNY

## 2022-12-27 NOTE — PROGRESS NOTE ADULT - PROBLEM SELECTOR PLAN 4
Creatinine 4.73 on admission. Suspect prerenal PINA 2/2 poor renal perfusion from DKA/HHS, as also markedly hypernatremic on admission (uncorrected 138) and rapid improvement with hypotonic fluid resuscitation.     - collateral from PCP regarding baseline kidney function  - [facility]  - PINA gradually improving, may be close to baseline?

## 2022-12-27 NOTE — PROGRESS NOTE ADULT - PROBLEM SELECTOR PLAN 1
Reports taking lantus 30 or 40 units every evening with no mealtime coverage. A1c on admission 11.9 suggesting poor control, likely noncompliant. Pt is responsible for taking own meds at home.    - f/u SW regarding helping pt get HHA to help with administering meds  - Endocrine following  - c/w 15U lantus qHS  - c/w lispro 5U premeal  - c/w ISS QID  - diabetes education  - outpatient follow up at [facility] Reports taking lantus 30 or 40 units every evening with no mealtime coverage. A1c on admission 11.9 suggesting poor control, likely noncompliant. Pt is responsible for taking own meds at home.    - f/u SW regarding helping pt get HHA to help with administering meds  - Endocrine following - ask if possible to dc with lantus qhs and orals/injectabales?  - for now upon dc, plan for mixed insulin twice per day  - c/w 15U lantus qHS  - c/w lispro 5U premeal  - c/w ISS QID  - diabetes education  - outpatient follow up at [facility]

## 2022-12-27 NOTE — DISCHARGE NOTE PROVIDER - HOSPITAL COURSE
#Discharge: do not delete    81yo M with PMHx of HTN, HLD, AFib (not on AC), DM on insulin who presented with AMS, decreased PO intake, and increased thirst for several days. Found to be hyperglycemic with anion gap acidosis, admitted to MICU for DKA treatment with insulin gtt.      Hospital course (by problem):   #Insulin dependent type 2 diabetes mellitus.   ·  Plan: Reports taking lantus 30 or 40 units every evening with no mealtime coverage. A1c on admission 11.9 suggesting poor control, likely noncompliant. Pt is responsible for taking own meds at home.  - Endocrine consulted: plan to dc with lantus and lowest dose GLP1  - [facility]   - 15 lantus and 5 premeal inpatient    #Atrial fibrillation.   ·  Plan: History of a fib on rate control with intermittent sinus tachycardia 2/2 severe dehydration.   CHADSVASC 5  will not start AC here a pt with melena.  - f/u outpatient with PCP regarding AC  - c/w home coreg 6.25 BID.    #Chronic HFrEF (heart failure with reduced ejection fraction).   ·  Plan: Daughter knows he was on a diuretic as he was hospitalized in November with fluid in his legs and his lungs, but doesn't remember other details.  TTE this admission: Moderately reduced LV systolic function, Mildly reduced RV systolic function, Mild tricuspid regurgitation, No evidence of pulmonary hypertension, pulmonary artery systolic pressure is 24 mmHg, Small pericardial effusion.  - c/w home coreg 6.25 BID  - [facility]  - euvolemic  - restart home aldactone 12.5 qd as outpatient as BP is normotensive/soft here.    #PINA (acute kidney injury).   ·  Plan: Creatinine 4.73 on admission. Suspect prerenal PINA 2/2 poor renal perfusion from DKA/HHS, as also markedly hypernatremic on admission (uncorrected 138) and rapid improvement with hypotonic fluid resuscitation.   - [facility] with [facility]  - PINA gradually improving    #Melena.   ·  Plan: 1 documented episode of black tarry stool. Likely subacute-chronic issue as patient reports that he was due for colonoscopy despite being age 82.   Iron studies indicate CECE.  - GI consulted by MICU and recommended outpatient EGD/colo  - c/w PPI bid PO, pt should follow up with their GI team and go for his previously planned colonoscopy  - hold home aspirin as pt had melena when in micu; also no indication for aspirin in this patient.    #HTN (hypertension).   ·  Plan: Home meds: coreg 6.25 BID, aldactone 12.5 qd  - c/w home coreg 6.25 bID  - restart home aldactone 12.5 qd as outpatient as BP is normotensive/soft here    Patient was discharged to: [facility]    New medications: none  Medications that were stopped: aspirin    Items to follow up as outpatient: PCP, GI, Endocrine    Physical exam at the time of discharge:       Refill on clonazepam  Last dr 10/20/20  pdmp reviewed due 12/16 date is updated on script  Med set up to sign   #Discharge: do not delete    83yo M with PMHx of HTN, HLD, AFib (not on AC), DM on insulin who presented with AMS, decreased PO intake, and increased thirst for several days. Found to be hyperglycemic with anion gap acidosis, admitted to MICU for DKA treatment with insulin gtt.      Hospital course (by problem):   #Insulin dependent type 2 diabetes mellitus.   ·  Plan: Reports taking lantus 30 or 40 units every evening with no mealtime coverage. A1c on admission 11.9 suggesting poor control, likely noncompliant. Pt is responsible for taking own meds at home.  - Endocrine consulted: Plan to discharge on mixed insulin 70/30 12 units twice daily (before breakfast and before dinner)   - [facility]   - Was on 15 lantus and 5 premeal while inpatient; discontinue basal/bolus dosing and transition to mixed insulin at [facility].    #Atrial fibrillation.   ·  Plan: History of a fib on rate control with intermittent sinus tachycardia 2/2 severe dehydration.   CHADSVASC 5  will not start AC here a pt with melena.  - f/u outpatient with PCP regarding AC  - c/w home coreg 6.25 BID.    #Chronic HFrEF (heart failure with reduced ejection fraction).   ·  Plan: Daughter knows he was on a diuretic as he was hospitalized in November with fluid in his legs and his lungs, but doesn't remember other details.  TTE this admission: Moderately reduced LV systolic function, Mildly reduced RV systolic function, Mild tricuspid regurgitation, No evidence of pulmonary hypertension, pulmonary artery systolic pressure is 24 mmHg, Small pericardial effusion.  - c/w home coreg 6.25 BID  - [facility]  - restart home aldactone 12.5 qd as outpatient    #PINA (acute kidney injury).   ·  Plan: Creatinine 4.73 on admission. Suspect prerenal PINA 2/2 poor renal perfusion from DKA/HHS, as also markedly hypernatremic on admission (uncorrected 138) and rapid improvement with hypotonic fluid resuscitation.   - [facility] with [facility]  - PINA gradually improving    #Melena.   ·  Plan: 1 documented episode of black tarry stool. Likely subacute-chronic issue as patient reports that he was due for colonoscopy despite being age 82.   Iron studies indicate CECE.  - GI consulted by MICU and recommended outpatient EGD/colo  - c/w PPI bid PO, pt should follow up with their GI team and go for his previously planned colonoscopy  - hold home aspirin as pt had melena when in micu; also no indication for aspirin in this patient.    #HTN (hypertension).   ·  Plan: Home meds: coreg 6.25 BID, aldactone 12.5 qd  - c/w home coreg 6.25 bID  - restart home aldactone 12.5 qd as outpatient    Patient was discharged to: [facility]    New medications: Insulin 70/30 12 units twice daily (before breakfast and before dinner). Protonix 40 mg PO BID.  Medications that were stopped: aspirin    Items to follow up as outpatient: PCP, GI, Endocrine    Physical exam at the time of discharge:  General: NAD, sitting upright in bed  HEENT: dry oral MM, poor dentition  Neck: supple, no JVD  Cardiovascular: +S1/S2; RRR, no MRG  Respiratory: Ronchi heard in bilateral lung fields, L>R. No rales, wheezing, tachypnea, or accessory muscle use.  Gastrointestinal: soft, NT/ND  Uro: condom catheter with yellow urine  Extremities: b/l SCD's; WWP; no edema, clubbing or cyanosis; ecchymoses of b/l arms  Vascular: 2+ radial pulses B/L  Neurological: AAOx3; no focal deficits  Psych: calm, appropriate     #Discharge: do not delete    83yo M with PMHx of HTN, HLD, AFib (not on AC), DM on insulin who presented with AMS, decreased PO intake, and increased thirst for several days. Found to be hyperglycemic with anion gap acidosis, admitted to MICU for DKA treatment with insulin gtt.      Hospital course (by problem):   #Insulin dependent type 2 diabetes mellitus.   ·  Plan: Reports taking lantus 30 or 40 units every evening with no mealtime coverage. A1c on admission 11.9 suggesting poor control, likely noncompliant. Pt is responsible for taking own meds at home.  - Endocrine consulted: Plan to discharge on NovoLog Mix 70/30 12 units twice daily (before breakfast and before dinner)   - [facility]   - Was on 15 lantus and 5 premeal while inpatient; discontinue basal/bolus dosing and transition to NovoLog Mix at [facility].    #Atrial fibrillation.   ·  Plan: History of a fib on rate control with intermittent sinus tachycardia 2/2 severe dehydration.   CHADSVASC 5  will not start AC here a pt with melena.  - f/u outpatient with PCP regarding AC  - c/w home coreg 6.25 BID.    #Chronic HFrEF (heart failure with reduced ejection fraction).   ·  Plan: Daughter knows he was on a diuretic as he was hospitalized in November with fluid in his legs and his lungs, but doesn't remember other details.  TTE this admission: Moderately reduced LV systolic function, Mildly reduced RV systolic function, Mild tricuspid regurgitation, No evidence of pulmonary hypertension, pulmonary artery systolic pressure is 24 mmHg, Small pericardial effusion.  - c/w home coreg 6.25 BID  - [facility]  - restart home aldactone 12.5 qd as outpatient  - started Lasix 20 mg PO every other day PRN for lower extremity edema     #PINA (acute kidney injury).   ·  Plan: Creatinine 4.73 on admission. Suspect prerenal PINA 2/2 poor renal perfusion from DKA/HHS, as also markedly hypernatremic on admission (uncorrected 138) and rapid improvement with hypotonic fluid resuscitation.   - [facility]  - PINA gradually improving    #Melena.   ·  Plan: 1 documented episode of black tarry stool. Likely subacute-chronic issue as patient reports that he was due for colonoscopy despite being age 82.   Iron studies indicate CECE.  - GI consulted by MICU and recommended outpatient EGD/colo  - c/w PPI BID PO, pt should follow up with their GI team and go for his previously planned colonoscopy  - hold home aspirin as pt had melena when in micu; also no indication for aspirin in this patient.    #HTN (hypertension).   ·  Plan: Home meds: coreg 6.25 BID, aldactone 12.5 qd  - c/w home coreg 6.25 bID  - restart home aldactone 12.5 qd as outpatient    Patient was discharged to: [facility]    New medications: NovoLog Mix 70/30 12 units twice daily (before breakfast and before dinner), Protonix 40 mg PO BID, Lasix 20 mg PO every other day PRN for lower extremity edema  Medications that were stopped: aspirin    Items to follow up as outpatient: PCP, GI, Endocrine    Physical exam at the time of discharge:  General: NAD, sitting upright in bed  HEENT: dry oral MM, poor dentition  Neck: supple, no JVD  Cardiovascular: +S1/S2; RRR, no MRG  Respiratory: Ronchi heard in bilateral lung fields, L>R. No rales, wheezing, tachypnea, or accessory muscle use.  Gastrointestinal: soft, NT/ND  Uro: condom catheter with yellow urine  Extremities: b/l SCD's; WWP; no edema, clubbing or cyanosis; ecchymoses of b/l arms  Vascular: 2+ radial pulses B/L  Neurological: AAOx3; no focal deficits  Psych: calm, appropriate

## 2022-12-27 NOTE — PROGRESS NOTE ADULT - SUBJECTIVE AND OBJECTIVE BOX
INTERVAL EVENTS: WANDA    SUBJECTIVE / INTERVAL HPI: Patient seen and examined at bedside. Reports feeling well. Has some constipation, did not have BM yesterday. Denies CP, palpitations, abd pain, melena, dysuria.    ROS: negative unless otherwise stated above.    VITAL SIGNS:  Vital Signs Last 24 Hrs  T(C): 36.8 (27 Dec 2022 06:22), Max: 36.9 (26 Dec 2022 20:35)  T(F): 98.3 (27 Dec 2022 06:22), Max: 98.4 (26 Dec 2022 20:35)  HR: 92 (27 Dec 2022 06:22) (83 - 93)  BP: 112/69 (27 Dec 2022 06:22) (103/61 - 121/76)  BP(mean): --  RR: 20 (27 Dec 2022 06:22) (18 - 20)  SpO2: 99% (27 Dec 2022 06:22) (98% - 100%)    Parameters below as of 27 Dec 2022 06:22  Patient On (Oxygen Delivery Method): room air          12-26-22 @ 07:01  -  12-27-22 @ 07:00  --------------------------------------------------------  IN: 0 mL / OUT: 500 mL / NET: -500 mL        PHYSICAL EXAM:    General: NAD, sitting upright in bed  HEENT: dry oral MM, poor dentition  Neck: supple, no JVD  Cardiovascular: +S1/S2; RRR, no MRG  Respiratory: CTA B/L anteriorly; no W/R/R, no accessory muscle use  Gastrointestinal: soft, NT/ND  Uro: condom catheter with yellow urine  Extremities: b/l SCD's; WWP; no edema, clubbing or cyanosis; ecchymoses of b/l arms  Vascular: 2+ radial pulses B/L  Neurological: AAOx3; no focal deficits  Psych: calm, appropriate    MEDICATIONS:  MEDICATIONS  (STANDING):  ascorbic acid 500 milliGRAM(s) Oral daily  atorvastatin 40 milliGRAM(s) Oral at bedtime  bacitracin   Ointment 1 Application(s) Topical two times a day  carvedilol 6.25 milliGRAM(s) Oral every 12 hours  folic acid 1 milliGRAM(s) Oral daily  glucagon  Injectable 1 milliGRAM(s) IntraMuscular once  insulin glargine Injectable (LANTUS) 15 Unit(s) SubCutaneous at bedtime  insulin lispro (ADMELOG) corrective regimen sliding scale   SubCutaneous Before meals and at bedtime  insulin lispro Injectable (ADMELOG) 5 Unit(s) SubCutaneous three times a day before meals  pantoprazole    Tablet 40 milliGRAM(s) Oral every 12 hours  tamsulosin 0.4 milliGRAM(s) Oral at bedtime    MEDICATIONS  (PRN):  acetaminophen     Tablet .. 650 milliGRAM(s) Oral every 6 hours PRN Temp greater or equal to 38C (100.4F), Mild Pain (1 - 3)  dextrose Oral Gel 15 Gram(s) Oral once PRN Blood Glucose LESS THAN 70 milliGRAM(s)/deciliter      ALLERGIES:  Allergies    Allergy Status Unknown    Intolerances        LABS:                        10.0   4.08  )-----------( 144      ( 26 Dec 2022 05:30 )             31.2     12-26    147<H>  |  116<H>  |  24<H>  ----------------------------<  66<L>  3.5   |  22  |  1.32<H>    Ca    8.4      26 Dec 2022 05:30  Phos  2.0     12-26  Mg     2.1     12-26          CAPILLARY BLOOD GLUCOSE      POCT Blood Glucose.: 190 mg/dL (26 Dec 2022 21:15)      RADIOLOGY & ADDITIONAL TESTS: Reviewed. Hospital Course: 81 y/o Emir[place] speaking male with hx of HTN, HLD, AFib, not on AC, DM on insulin, presents with AMS for several days. Per pt's family, pt has been refusing to eat or take any of his meds over the past 2-3 days. Admitted to MICU for DKA vs HHS management. Started on insulin gtt,  AG closed x2, however pt still hyperglycemic to 420-510. Switched to hyperglycemia protocol. Noted to have 1 episode of melena, consulted GI with no acute intervention indicated. Started PPI BID and ordered anemia workup. Bridged with NPH 15u and started on Lantus 30 at bedtime. Endocrine consulted, rec mixed insulin upon discharge. Medically ready for discharge to [facility]. However since [facility] may be difficult to get to [facility] so if has functional improvement with PT may end up going home.       INTERVAL EVENTS: WANDA    SUBJECTIVE / INTERVAL HPI: Patient seen and examined at bedside. Reports feeling well. Has some constipation, did not have BM yesterday. Denies CP, palpitations, abd pain, melena, dysuria.    ROS: negative unless otherwise stated above.    VITAL SIGNS:  Vital Signs Last 24 Hrs  T(C): 36.8 (27 Dec 2022 06:22), Max: 36.9 (26 Dec 2022 20:35)  T(F): 98.3 (27 Dec 2022 06:22), Max: 98.4 (26 Dec 2022 20:35)  HR: 92 (27 Dec 2022 06:22) (83 - 93)  BP: 112/69 (27 Dec 2022 06:22) (103/61 - 121/76)  BP(mean): --  RR: 20 (27 Dec 2022 06:22) (18 - 20)  SpO2: 99% (27 Dec 2022 06:22) (98% - 100%)    Parameters below as of 27 Dec 2022 06:22  Patient On (Oxygen Delivery Method): room air          12-26-22 @ 07:01  -  12-27-22 @ 07:00  --------------------------------------------------------  IN: 0 mL / OUT: 500 mL / NET: -500 mL        PHYSICAL EXAM:    General: NAD, sitting upright in bed  HEENT: dry oral MM, poor dentition  Neck: supple, no JVD  Cardiovascular: +S1/S2; RRR, no MRG  Respiratory: CTA B/L anteriorly; no W/R/R, no accessory muscle use  Gastrointestinal: soft, NT/ND  Uro: condom catheter with yellow urine  Extremities: b/l SCD's; WWP; no edema, clubbing or cyanosis; ecchymoses of b/l arms  Vascular: 2+ radial pulses B/L  Neurological: AAOx3; no focal deficits  Psych: calm, appropriate    MEDICATIONS:  MEDICATIONS  (STANDING):  ascorbic acid 500 milliGRAM(s) Oral daily  atorvastatin 40 milliGRAM(s) Oral at bedtime  bacitracin   Ointment 1 Application(s) Topical two times a day  carvedilol 6.25 milliGRAM(s) Oral every 12 hours  folic acid 1 milliGRAM(s) Oral daily  glucagon  Injectable 1 milliGRAM(s) IntraMuscular once  insulin glargine Injectable (LANTUS) 15 Unit(s) SubCutaneous at bedtime  insulin lispro (ADMELOG) corrective regimen sliding scale   SubCutaneous Before meals and at bedtime  insulin lispro Injectable (ADMELOG) 5 Unit(s) SubCutaneous three times a day before meals  pantoprazole    Tablet 40 milliGRAM(s) Oral every 12 hours  tamsulosin 0.4 milliGRAM(s) Oral at bedtime    MEDICATIONS  (PRN):  acetaminophen     Tablet .. 650 milliGRAM(s) Oral every 6 hours PRN Temp greater or equal to 38C (100.4F), Mild Pain (1 - 3)  dextrose Oral Gel 15 Gram(s) Oral once PRN Blood Glucose LESS THAN 70 milliGRAM(s)/deciliter      ALLERGIES:  Allergies    Allergy Status Unknown    Intolerances        LABS:                        10.0   4.08  )-----------( 144      ( 26 Dec 2022 05:30 )             31.2     12-26    147<H>  |  116<H>  |  24<H>  ----------------------------<  66<L>  3.5   |  22  |  1.32<H>    Ca    8.4      26 Dec 2022 05:30  Phos  2.0     12-26  Mg     2.1     12-26          CAPILLARY BLOOD GLUCOSE      POCT Blood Glucose.: 190 mg/dL (26 Dec 2022 21:15)      RADIOLOGY & ADDITIONAL TESTS: Reviewed.

## 2022-12-27 NOTE — PROGRESS NOTE ADULT - PROBLEM SELECTOR PLAN 6
Charted Hx of HTN, pending collateral. Normotensive in [facility] likely 2/2 renal failure and severe dehydration from HHS.  - c/w home coreg 6.25 bID  - restart home aldactone Charted Hx of HTN, pending collateral. Normotensive in [facility] likely 2/2 renal failure and severe dehydration from HHS.  - c/w home coreg 6.25 bID  - restart home aldactone 12.5 qd outpatient as BP is normotensive/soft here

## 2022-12-27 NOTE — PROGRESS NOTE ADULT - ATTENDING COMMENTS
I personally examined Mr Robles and discussed management with medical team. I agree with assessment except as below.  Patient awake, alert, denies abdominal pain, no SOB, no chest pain. Denies N/V, no tenderness in the arms. FS better controlled, no hypoglycemia.    Exam as above    Continue on insulin per endocrinology  Monitor for melena, hematochezia. Continue on PPI. GI as outpatient  SCD   Pending [facility]

## 2022-12-28 VITALS
SYSTOLIC BLOOD PRESSURE: 96 MMHG | TEMPERATURE: 97 F | HEART RATE: 68 BPM | RESPIRATION RATE: 16 BRPM | DIASTOLIC BLOOD PRESSURE: 60 MMHG | OXYGEN SATURATION: 98 %

## 2022-12-28 LAB
ALBUMIN SERPL ELPH-MCNC: 2.5 G/DL — LOW (ref 3.3–5)
ALP SERPL-CCNC: 74 U/L — SIGNIFICANT CHANGE UP (ref 40–120)
ALT FLD-CCNC: 8 U/L — LOW (ref 10–45)
ANION GAP SERPL CALC-SCNC: 7 MMOL/L — SIGNIFICANT CHANGE UP (ref 5–17)
AST SERPL-CCNC: 11 U/L — SIGNIFICANT CHANGE UP (ref 10–40)
BILIRUB SERPL-MCNC: 0.4 MG/DL — SIGNIFICANT CHANGE UP (ref 0.2–1.2)
BUN SERPL-MCNC: 15 MG/DL — SIGNIFICANT CHANGE UP (ref 7–23)
CALCIUM SERPL-MCNC: 8.2 MG/DL — LOW (ref 8.4–10.5)
CHLORIDE SERPL-SCNC: 110 MMOL/L — HIGH (ref 96–108)
CO2 SERPL-SCNC: 23 MMOL/L — SIGNIFICANT CHANGE UP (ref 22–31)
CREAT SERPL-MCNC: 1.27 MG/DL — SIGNIFICANT CHANGE UP (ref 0.5–1.3)
CULTURE RESULTS: SIGNIFICANT CHANGE UP
CULTURE RESULTS: SIGNIFICANT CHANGE UP
EGFR: 56 ML/MIN/1.73M2 — LOW
GLUCOSE BLDC GLUCOMTR-MCNC: 134 MG/DL — HIGH (ref 70–99)
GLUCOSE BLDC GLUCOMTR-MCNC: 241 MG/DL — HIGH (ref 70–99)
GLUCOSE SERPL-MCNC: 145 MG/DL — HIGH (ref 70–99)
HCT VFR BLD CALC: 29 % — LOW (ref 39–50)
HGB BLD-MCNC: 9.2 G/DL — LOW (ref 13–17)
MAGNESIUM SERPL-MCNC: 2 MG/DL — SIGNIFICANT CHANGE UP (ref 1.6–2.6)
MCHC RBC-ENTMCNC: 31.7 GM/DL — LOW (ref 32–36)
MCHC RBC-ENTMCNC: 33.5 PG — SIGNIFICANT CHANGE UP (ref 27–34)
MCV RBC AUTO: 105.5 FL — HIGH (ref 80–100)
NRBC # BLD: 0 /100 WBCS — SIGNIFICANT CHANGE UP (ref 0–0)
PHOSPHATE SERPL-MCNC: 2.6 MG/DL — SIGNIFICANT CHANGE UP (ref 2.5–4.5)
PLATELET # BLD AUTO: 148 K/UL — LOW (ref 150–400)
POTASSIUM SERPL-MCNC: 4.6 MMOL/L — SIGNIFICANT CHANGE UP (ref 3.5–5.3)
POTASSIUM SERPL-SCNC: 4.6 MMOL/L — SIGNIFICANT CHANGE UP (ref 3.5–5.3)
PROT SERPL-MCNC: 5.1 G/DL — LOW (ref 6–8.3)
RBC # BLD: 2.75 M/UL — LOW (ref 4.2–5.8)
RBC # FLD: 14.6 % — HIGH (ref 10.3–14.5)
SODIUM SERPL-SCNC: 140 MMOL/L — SIGNIFICANT CHANGE UP (ref 135–145)
SPECIMEN SOURCE: SIGNIFICANT CHANGE UP
SPECIMEN SOURCE: SIGNIFICANT CHANGE UP
TSH SERPL-MCNC: 1.3 UIU/ML — SIGNIFICANT CHANGE UP (ref 0.27–4.2)
WBC # BLD: 3.59 K/UL — LOW (ref 3.8–10.5)
WBC # FLD AUTO: 3.59 K/UL — LOW (ref 3.8–10.5)

## 2022-12-28 PROCEDURE — 71045 X-RAY EXAM CHEST 1 VIEW: CPT

## 2022-12-28 PROCEDURE — U0003: CPT

## 2022-12-28 PROCEDURE — 84484 ASSAY OF TROPONIN QUANT: CPT

## 2022-12-28 PROCEDURE — 85027 COMPLETE CBC AUTOMATED: CPT

## 2022-12-28 PROCEDURE — 70450 CT HEAD/BRAIN W/O DYE: CPT

## 2022-12-28 PROCEDURE — 83735 ASSAY OF MAGNESIUM: CPT

## 2022-12-28 PROCEDURE — 96365 THER/PROPH/DIAG IV INF INIT: CPT

## 2022-12-28 PROCEDURE — 93306 TTE W/DOPPLER COMPLETE: CPT

## 2022-12-28 PROCEDURE — 84443 ASSAY THYROID STIM HORMONE: CPT

## 2022-12-28 PROCEDURE — 97165 OT EVAL LOW COMPLEX 30 MIN: CPT

## 2022-12-28 PROCEDURE — 97162 PT EVAL MOD COMPLEX 30 MIN: CPT

## 2022-12-28 PROCEDURE — 86901 BLOOD TYPING SEROLOGIC RH(D): CPT

## 2022-12-28 PROCEDURE — 97530 THERAPEUTIC ACTIVITIES: CPT

## 2022-12-28 PROCEDURE — 92610 EVALUATE SWALLOWING FUNCTION: CPT

## 2022-12-28 PROCEDURE — 82803 BLOOD GASES ANY COMBINATION: CPT

## 2022-12-28 PROCEDURE — 83036 HEMOGLOBIN GLYCOSYLATED A1C: CPT

## 2022-12-28 PROCEDURE — 87637 SARSCOV2&INF A&B&RSV AMP PRB: CPT

## 2022-12-28 PROCEDURE — 82746 ASSAY OF FOLIC ACID SERUM: CPT

## 2022-12-28 PROCEDURE — 86850 RBC ANTIBODY SCREEN: CPT

## 2022-12-28 PROCEDURE — 86900 BLOOD TYPING SEROLOGIC ABO: CPT

## 2022-12-28 PROCEDURE — 36415 COLL VENOUS BLD VENIPUNCTURE: CPT

## 2022-12-28 PROCEDURE — 80048 BASIC METABOLIC PNL TOTAL CA: CPT

## 2022-12-28 PROCEDURE — 83880 ASSAY OF NATRIURETIC PEPTIDE: CPT

## 2022-12-28 PROCEDURE — 85025 COMPLETE CBC W/AUTO DIFF WBC: CPT

## 2022-12-28 PROCEDURE — 99233 SBSQ HOSP IP/OBS HIGH 50: CPT | Mod: GC

## 2022-12-28 PROCEDURE — 82607 VITAMIN B-12: CPT

## 2022-12-28 PROCEDURE — 82272 OCCULT BLD FECES 1-3 TESTS: CPT

## 2022-12-28 PROCEDURE — 97535 SELF CARE MNGMENT TRAINING: CPT

## 2022-12-28 PROCEDURE — 87086 URINE CULTURE/COLONY COUNT: CPT

## 2022-12-28 PROCEDURE — 87040 BLOOD CULTURE FOR BACTERIA: CPT

## 2022-12-28 PROCEDURE — 83605 ASSAY OF LACTIC ACID: CPT

## 2022-12-28 PROCEDURE — 96375 TX/PRO/DX INJ NEW DRUG ADDON: CPT

## 2022-12-28 PROCEDURE — 80053 COMPREHEN METABOLIC PANEL: CPT

## 2022-12-28 PROCEDURE — U0005: CPT

## 2022-12-28 PROCEDURE — 99291 CRITICAL CARE FIRST HOUR: CPT | Mod: 25

## 2022-12-28 PROCEDURE — 97116 GAIT TRAINING THERAPY: CPT

## 2022-12-28 PROCEDURE — 96366 THER/PROPH/DIAG IV INF ADDON: CPT

## 2022-12-28 PROCEDURE — 82728 ASSAY OF FERRITIN: CPT

## 2022-12-28 PROCEDURE — 82962 GLUCOSE BLOOD TEST: CPT

## 2022-12-28 PROCEDURE — 84100 ASSAY OF PHOSPHORUS: CPT

## 2022-12-28 PROCEDURE — 97110 THERAPEUTIC EXERCISES: CPT

## 2022-12-28 PROCEDURE — 83540 ASSAY OF IRON: CPT

## 2022-12-28 PROCEDURE — 81001 URINALYSIS AUTO W/SCOPE: CPT

## 2022-12-28 PROCEDURE — 85610 PROTHROMBIN TIME: CPT

## 2022-12-28 PROCEDURE — 85730 THROMBOPLASTIN TIME PARTIAL: CPT

## 2022-12-28 PROCEDURE — 83550 IRON BINDING TEST: CPT

## 2022-12-28 PROCEDURE — 82010 KETONE BODYS QUAN: CPT

## 2022-12-28 RX ORDER — PANTOPRAZOLE SODIUM 20 MG/1
1 TABLET, DELAYED RELEASE ORAL
Qty: 0 | Refills: 0 | DISCHARGE
Start: 2022-12-28

## 2022-12-28 RX ORDER — INSULIN ASPART 100 [IU]/ML
12 INJECTION, SUSPENSION SUBCUTANEOUS
Qty: 0 | Refills: 0 | DISCHARGE
Start: 2022-12-28

## 2022-12-28 RX ORDER — FUROSEMIDE 40 MG
1 TABLET ORAL
Qty: 0 | Refills: 0 | DISCHARGE

## 2022-12-28 RX ORDER — ASPIRIN/CALCIUM CARB/MAGNESIUM 324 MG
1 TABLET ORAL
Qty: 0 | Refills: 0 | DISCHARGE

## 2022-12-28 RX ADMIN — Medication 5 UNIT(S): at 12:23

## 2022-12-28 RX ADMIN — Medication 1 MILLIGRAM(S): at 12:02

## 2022-12-28 RX ADMIN — Medication 4: at 12:22

## 2022-12-28 RX ADMIN — POLYETHYLENE GLYCOL 3350 17 GRAM(S): 17 POWDER, FOR SOLUTION ORAL at 12:02

## 2022-12-28 RX ADMIN — Medication 500 MILLIGRAM(S): at 12:02

## 2022-12-28 RX ADMIN — PANTOPRAZOLE SODIUM 40 MILLIGRAM(S): 20 TABLET, DELAYED RELEASE ORAL at 07:03

## 2022-12-28 RX ADMIN — CARVEDILOL PHOSPHATE 6.25 MILLIGRAM(S): 80 CAPSULE, EXTENDED RELEASE ORAL at 08:57

## 2022-12-28 RX ADMIN — Medication 1 APPLICATION(S): at 06:49

## 2022-12-28 RX ADMIN — Medication 5 UNIT(S): at 08:57

## 2022-12-28 NOTE — PROGRESS NOTE ADULT - PROBLEM SELECTOR PLAN 1
Reports taking lantus 30 or 40 units every evening with no mealtime coverage. A1c on admission 11.9 suggesting poor control, likely noncompliant. Pt is responsible for taking own meds at home.    - f/u SW regarding helping pt get HHA to help with administering meds  - Per Endocrine recs, discharge on insulin 70/30, 12 units twice daily (before breakfast and dinner)   - c/w 15U lantus qHS while inpatient   - c/w lispro 5U premeal while inpatient   - c/w ISS QID  - diabetes education  - outpatient follow up at [facility]

## 2022-12-28 NOTE — PROGRESS NOTE ADULT - PROBLEM SELECTOR PROBLEM 6
HTN (hypertension)
HTN (hypertension)
PINA (acute kidney injury)
HTN (hypertension)
PINA (acute kidney injury)

## 2022-12-28 NOTE — PROGRESS NOTE ADULT - ATTENDING COMMENTS
I personally examined Mr Robles and discussed management with medical team. I agree with assessment except as below.  Patient reports feeling fine, denies SOB, no chest pain, no orthopnea. He reports chronic dry cough. Denies other complaints.    Exam as above   Labs reviewed    FS better controlled, appreciate Endocrinology  Hb fluctuating, stable. No report of recurrent bleed. Continue on PPI, [facility]  Cardiology follow-up as outpatient  Pending MANNY I personally examined Mr Robles and discussed management with medical team. I agree with assessment except as below.  Patient reports feeling fine, denies SOB, no chest pain, no orthopnea. He reports chronic dry cough. Denies other complaints.  BP taken by me 109/66 @ 1pm    Exam as above   Labs reviewed    FS better controlled, appreciate Endocrinology  Hb fluctuating, stable. No report of recurrent bleed. Continue on PPI, patient [facility].  Cardiology follow-up as outpatient  Pending [facility]

## 2022-12-28 NOTE — PROGRESS NOTE ADULT - PROBLEM SELECTOR PLAN 3
Daughter knows he was on a diuretic as he was hospitalized in November with fluid in his legs and his lungs, but doesn't remember other details.  TTE this admission: Moderately reduced LV systolic function, Mildly reduced RV systolic function, Mild tricuspid regurgitation, No evidence of pulmonary hypertension, pulmonary artery systolic   pressure is 24 mmHg, Small pericardial effusion.  Pt currently with mild nonproductive cough, bedside POCUS showed mild B-lines present in bilateral lungs with no consolidations.     - c/w home coreg 6.25 BID  - f/u cardiology outpatient with [facility], they can consider starting SGLT2 inhibitor  - restart home aldactone 12.5 qd outpatient as BP is normotensive/soft here  - Obtain additional collateral on prior diuretic history

## 2022-12-28 NOTE — PROGRESS NOTE ADULT - PROBLEM SELECTOR PROBLEM 3
Chronic HFrEF (heart failure with reduced ejection fraction)
Atrial fibrillation
Chronic HFrEF (heart failure with reduced ejection fraction)
Chronic HFrEF (heart failure with reduced ejection fraction)
Atrial fibrillation

## 2022-12-28 NOTE — PROGRESS NOTE ADULT - PROBLEM SELECTOR PROBLEM 5
Chronic HFrEF (heart failure with reduced ejection fraction)
Chandni
Chronic HFrEF (heart failure with reduced ejection fraction)

## 2022-12-28 NOTE — DISCHARGE NOTE NURSING/CASE MANAGEMENT/SOCIAL WORK - PATIENT PORTAL LINK FT
You can access the FollowMyHealth Patient Portal offered by [facility] by registering at the following website: [email or web address]. By joining [facility]’s FollowMyHealth portal, you will also be able to view your health information using other applications (apps) compatible with our system.

## 2022-12-28 NOTE — PROGRESS NOTE ADULT - ASSESSMENT
81yo M with PMHx of HTN, HLD, AFib (not on AC), DM on insulin who presented with AMS, decreased PO intake, and increased thirst for several days. Found to be hyperglycemic with anion gap acidosis, admitted to MICU for DKA treatment with insulin gtt. Stepped down to [facility]. Follow up Endocrinology recs for outpatient insulin regimen. Pending [facility], f/u SW as pt likely has insurance through [employer].

## 2022-12-28 NOTE — PROGRESS NOTE ADULT - PROVIDER SPECIALTY LIST ADULT
Endocrinology
Internal Medicine
MICU
MICU
Internal Medicine
Internal Medicine

## 2022-12-28 NOTE — PROGRESS NOTE ADULT - PROBLEM SELECTOR PLAN 2
History of a fib on no rate control with intermittent sinus tachycardia 2/2 severe dehydration.   Normal sinus on transfer from MICU to [facility].   Med rec completed with daughter, pt was only on aspirin (primary prevention?).  CHADSVASC 5    - f/u outpatient with PCP regarding AC  - c/w home coreg 6.25 BID

## 2022-12-28 NOTE — PROGRESS NOTE ADULT - PROBLEM SELECTOR PLAN 5
Likely subacute-chronic issue as patient reports that he was due for colonoscopy despite being age 82.   Iron studies indicate CECE.    - GI consulted by MICU and recommended outpatient EGD/colo  - c/w PPI bid PO, pt is set up with GI outpatient  - hold home aspirin as pt had melena when in micu; also no indication for aspirin in this patient

## 2022-12-28 NOTE — PROGRESS NOTE ADULT - SUBJECTIVE AND OBJECTIVE BOX
Patient is a 82y old  Male who presents with a chief complaint of AMS (28 Dec 2022 10:06)      INTERVAL HPI/OVERNIGHT EVENTS:  Patient seen and examined at bedside. No acute events overnight. This morning, pt lying in bed, in NAD. Reports mild cough, bedside POCUS showed mild B-lines in bilateral lung fields with no focal consolidations. Otherwise no complaints. Denies fever, chills, HA, dizziness, lightheadedness, lethargy, fatigue, thirst, CP, SOB, palpitations, abdominal pain, n/v, changes in bowel/urinary habits, flank pain, back pain, numbness, or tingling. 12pt ROS otherwise negative.      FAMILY HISTORY:      VITAL SIGNS:  T(C): 36.6 (12-28-22 @ 05:41), Max: 36.8 (12-27-22 @ 21:40)  HR: 75 (12-28-22 @ 05:41) (75 - 85)  BP: 104/60 (12-28-22 @ 05:41) (104/60 - 115/66)  RR: 16 (12-28-22 @ 05:41) (16 - 18)  SpO2: 98% (12-28-22 @ 05:41) (98% - 99%)  Wt(kg): --Vital Signs Last 24 Hrs  T(C): 36.6 (28 Dec 2022 05:41), Max: 36.8 (27 Dec 2022 21:40)  T(F): 97.8 (28 Dec 2022 05:41), Max: 98.3 (27 Dec 2022 21:40)  HR: 75 (28 Dec 2022 05:41) (75 - 85)  BP: 104/60 (28 Dec 2022 05:41) (104/60 - 115/66)  BP(mean): --  RR: 16 (28 Dec 2022 05:41) (16 - 18)  SpO2: 98% (28 Dec 2022 05:41) (98% - 99%)    Parameters below as of 27 Dec 2022 21:40  Patient On (Oxygen Delivery Method): room air      Allergy Status Unknown      PHYSICAL EXAM:  General: NAD, sitting upright in bed  HEENT: dry oral MM, poor dentition  Neck: supple, no JVD  Cardiovascular: +S1/S2; RRR, no MRG  Respiratory: Ronchi heard in bilateral lung fields, L>R. No rales, wheezing, tachypnea, or accessory muscle use.  Gastrointestinal: soft, NT/ND  Uro: condom catheter with yellow urine  Extremities: b/l SCD's; WWP; no edema, clubbing or cyanosis; ecchymoses of b/l arms  Vascular: 2+ radial pulses B/L  Neurological: AAOx3; no focal deficits  Psych: calm, appropriate    Consultant(s) Notes Reviewed:  [x ] YES  [ ] NO  Care Discussed with Consultants/Other Providers [ x] YES  [ ] NO    LABS:                        9.2    3.59  )-----------( 148      ( 28 Dec 2022 07:16 )             29.0     12-28    140  |  110<H>  |  15  ----------------------------<  145<H>  4.6   |  23  |  1.27    Ca    8.2<L>      28 Dec 2022 07:16  Phos  2.6     12-28  Mg     2.0     12-28    TPro  5.1<L>  /  Alb  2.5<L>  /  TBili  0.4  /  DBili  x   /  AST  11  /  ALT  8<L>  /  AlkPhos  74  12-28        CAPILLARY BLOOD GLUCOSE      POCT Blood Glucose.: 134 mg/dL (28 Dec 2022 08:09)                          I & O Summary:      Microbiology:        RADIOLOGY, EKG AND ADDITIONAL TESTS: Reviewed.      RADIOLOGY & ADDITIONAL TESTS:    Imaging Personally Reviewed:  [ ] YES  [ ] NO  acetaminophen     Tablet .. 650 milliGRAM(s) Oral every 6 hours PRN  ascorbic acid 500 milliGRAM(s) Oral daily  atorvastatin 40 milliGRAM(s) Oral at bedtime  bacitracin   Ointment 1 Application(s) Topical two times a day  carvedilol 6.25 milliGRAM(s) Oral every 12 hours  dextrose Oral Gel 15 Gram(s) Oral once PRN  folic acid 1 milliGRAM(s) Oral daily  glucagon  Injectable 1 milliGRAM(s) IntraMuscular once  insulin glargine Injectable (LANTUS) 15 Unit(s) SubCutaneous at bedtime  insulin lispro (ADMELOG) corrective regimen sliding scale   SubCutaneous Before meals and at bedtime  insulin lispro Injectable (ADMELOG) 5 Unit(s) SubCutaneous three times a day before meals  pantoprazole    Tablet 40 milliGRAM(s) Oral every 12 hours  polyethylene glycol 3350 17 Gram(s) Oral every 24 hours  senna 2 Tablet(s) Oral at bedtime  tamsulosin 0.4 milliGRAM(s) Oral at bedtime      HEALTH ISSUES - PROBLEM Dx:  Insulin dependent type 2 diabetes mellitus    HTN (hypertension)    Atrial fibrillation    Melena    Chronic HFrEF (heart failure with reduced ejection fraction)    PINA (acute kidney injury)    Prophylactic measure

## 2022-12-28 NOTE — PROGRESS NOTE ADULT - PROBLEM SELECTOR PLAN 6
Charted Hx of HTN, pending collateral. Normotensive in [facility] likely 2/2 renal failure and severe dehydration from HHS.  - c/w home coreg 6.25 bID  - restart home aldactone 12.5 qd outpatient as BP is normotensive/soft here

## 2023-01-04 DIAGNOSIS — G93.41 METABOLIC ENCEPHALOPATHY: ICD-10-CM

## 2023-01-04 DIAGNOSIS — Z91.14 PATIENT'S OTHER NONCOMPLIANCE WITH MEDICATION REGIMEN: ICD-10-CM

## 2023-01-04 DIAGNOSIS — E11.10 TYPE 2 DIABETES MELLITUS WITH KETOACIDOSIS WITHOUT COMA: ICD-10-CM

## 2023-01-04 DIAGNOSIS — F17.200 NICOTINE DEPENDENCE, UNSPECIFIED, UNCOMPLICATED: ICD-10-CM

## 2023-01-04 DIAGNOSIS — I50.22 CHRONIC SYSTOLIC (CONGESTIVE) HEART FAILURE: ICD-10-CM

## 2023-01-04 DIAGNOSIS — Z79.4 LONG TERM (CURRENT) USE OF INSULIN: ICD-10-CM

## 2023-01-04 DIAGNOSIS — I13.0 HYPERTENSIVE HEART AND CHRONIC KIDNEY DISEASE WITH HEART FAILURE AND STAGE 1 THROUGH STAGE 4 CHRONIC KIDNEY DISEASE, OR UNSPECIFIED CHRONIC KIDNEY DISEASE: ICD-10-CM

## 2023-01-04 DIAGNOSIS — R57.1 HYPOVOLEMIC SHOCK: ICD-10-CM

## 2023-01-04 DIAGNOSIS — T38.3X6A UNDERDOSING OF INSULIN AND ORAL HYPOGLYCEMIC [ANTIDIABETIC] DRUGS, INITIAL ENCOUNTER: ICD-10-CM

## 2023-01-04 DIAGNOSIS — E11.22 TYPE 2 DIABETES MELLITUS WITH DIABETIC CHRONIC KIDNEY DISEASE: ICD-10-CM

## 2023-01-04 DIAGNOSIS — E78.5 HYPERLIPIDEMIA, UNSPECIFIED: ICD-10-CM

## 2023-01-04 DIAGNOSIS — Z85.028 PERSONAL HISTORY OF OTHER MALIGNANT NEOPLASM OF STOMACH: ICD-10-CM

## 2023-01-04 DIAGNOSIS — E86.0 DEHYDRATION: ICD-10-CM

## 2023-01-04 DIAGNOSIS — N18.9 CHRONIC KIDNEY DISEASE, UNSPECIFIED: ICD-10-CM

## 2023-01-04 DIAGNOSIS — K92.1 MELENA: ICD-10-CM

## 2023-01-04 DIAGNOSIS — N17.0 ACUTE KIDNEY FAILURE WITH TUBULAR NECROSIS: ICD-10-CM

## 2023-01-04 DIAGNOSIS — I48.0 PAROXYSMAL ATRIAL FIBRILLATION: ICD-10-CM

## 2023-01-04 DIAGNOSIS — E87.0 HYPEROSMOLALITY AND HYPERNATREMIA: ICD-10-CM

## 2023-01-04 DIAGNOSIS — D53.9 NUTRITIONAL ANEMIA, UNSPECIFIED: ICD-10-CM

## 2023-09-28 NOTE — PROVIDER CONTACT NOTE (HYPOGLYCEMIA EVENT) - NS PROVIDER CONTACT RECOMMEND-HYPO
Reviewed discharge paperwork with pt and opportunity for questions provided. Strainer and hat given. PIV  removed. Pt ambulatory and discharged to self care. Pt already drank 4 oz juice; pureed tray on its way up from kitchen. Per MD Grigsby, recheck after patient eats and hold pre-meal insulin until know that patient will tolerate eating new pureed diet

## 2024-03-03 NOTE — PROVIDER CONTACT NOTE (CRITICAL VALUE NOTIFICATION) - BACKGROUND
Patient brought in from [address] [place] [address] by ems. Patient had a GLF and complaining of all over body aches.   115/87  103  96     Triage Assessment (Adult)       Row Name 03/03/24 1134          Triage Assessment    Airway WDL WDL        Respiratory WDL    Respiratory WDL WDL        Skin Circulation/Temperature WDL    Skin Circulation/Temperature WDL WDL        Cardiac WDL    Cardiac WDL WDL        Peripheral/Neurovascular WDL    Peripheral Neurovascular WDL WDL        Cognitive/Neuro/Behavioral WDL    Cognitive/Neuro/Behavioral WDL WDL                      pt at baseline A&Ox3 per family. pt PMH DM, and HTN stopped taking his medications and became lethargic over the past couple of days. pt taken LHGV with a serum glucose level of 1124, pt started on insulin drip and fluids.